# Patient Record
Sex: FEMALE | Race: WHITE | NOT HISPANIC OR LATINO | Employment: PART TIME | URBAN - NONMETROPOLITAN AREA
[De-identification: names, ages, dates, MRNs, and addresses within clinical notes are randomized per-mention and may not be internally consistent; named-entity substitution may affect disease eponyms.]

---

## 2019-09-05 NOTE — PROGRESS NOTES
Subjective     Lianne Martinez is a 29 year old female who presents to clinic today for the following health issues:    HPI   New Patient/Transfer of Care  Mole/skin check       Duration:  /has had a few removed     Description (location/character/radiation): on chest/face/ neck     Intensity:  mild    Accompanying signs and symptoms: irregular shaped / compound nevus .     History (similar episodes/previous evaluation): had a few removed.     Precipitating or alleviating factors: None    Therapies tried and outcome: None     Last PAP 2018 and normal per her report. She is a good historian.     No medications.     Left lower pelvic/ovary pain for 4 years. She had a  4 years ago and pain has continued. This was her first and only pregnancy. Pain is continuous. She feels it is left ovary pain. She's had follow up 3 separate times without an ultrasound. Normal menses. Denies urinary symptoms. Denies family history of gyn diagnoses. Denies chance of pregnancy, but will be trying for pregnancy soon.       Patient Active Problem List   Diagnosis     Family history of genetic disease     Vulvodynia     Past Surgical History:   Procedure Laterality Date     C/SECTION, LOW TRANSVERSE  10/03/2015       Social History     Tobacco Use     Smoking status: Never Smoker     Smokeless tobacco: Never Used   Substance Use Topics     Alcohol use: Never     Frequency: Never     Family History   Problem Relation Age of Onset     Cirrhosis Mother      Liver Disease Mother      Liver Disease Maternal Aunt      Cirrhosis Maternal Aunt          No current outpatient medications on file.     Allergies   Allergen Reactions     Penicillins        Reviewed and updated as needed this visit by Provider  Allergies  Meds  Problems  Med Hx  Surg Hx         Review of Systems   ROS COMP: Constitutional, HEENT, cardiovascular, pulmonary, gi and gu systems are negative, except as otherwise noted. Moles are growing and changing colors  "to right side of neck and right collar bone area. Left ovary/pelvic pain.       Objective    BP 98/72 (BP Location: Right arm, Patient Position: Sitting, Cuff Size: Adult Regular)   Pulse 69   Temp 98.3  F (36.8  C)   Ht 1.628 m (5' 4.1\")   Wt 61.8 kg (136 lb 3.2 oz)   SpO2 100%   BMI 23.31 kg/m    Body mass index is 23.31 kg/m .  Physical Exam   GENERAL: healthy, alert and no distress  RESP: lungs clear to auscultation - no rales, rhonchi or wheezes  CV: regular rate and rhythm, normal S1 S2, no S3 or S4, no murmur, click or rub, no peripheral edema and peripheral pulses strong  ABDOMEN: soft, nontender, no hepatosplenomegaly, no masses and bowel sounds normal  SKIN: no suspicious rashes. Light brown shiny nevi to right collar bone region. Nevi increasing in size/shape to right side of neck.   PSYCH: mentation appears normal, affect normal/bright      Assessment & Plan   Assessment      Plan  (I78.1) Nevus, non-neoplastic  (primary encounter diagnosis)  Comment: Changing color and size of nevi.  Plan: DERMATOLOGY REFERRAL            (R10.2) Pelvic pain in female  Comment: Left ovary pain for 4 years post child birth via .   Plan: US Pelvic Complete with Transvaginal            (Z76.89) Encounter to establish care  Comment: UTD on health maintenance. Last PAP 2018-Normal  Plan: Care established.       See Patient Instructions    Return if symptoms worsen or fail to improve.    Elizabeth Walker NP  [facility]        "

## 2019-09-06 ENCOUNTER — OFFICE VISIT (OUTPATIENT)
Dept: FAMILY MEDICINE | Facility: OTHER | Age: 29
End: 2019-09-06
Attending: NURSE PRACTITIONER
Payer: OTHER GOVERNMENT

## 2019-09-06 VITALS
TEMPERATURE: 98.3 F | HEIGHT: 64 IN | DIASTOLIC BLOOD PRESSURE: 72 MMHG | SYSTOLIC BLOOD PRESSURE: 98 MMHG | OXYGEN SATURATION: 100 % | HEART RATE: 69 BPM | WEIGHT: 136.2 LBS | BODY MASS INDEX: 23.25 KG/M2

## 2019-09-06 DIAGNOSIS — I78.1 NEVUS, NON-NEOPLASTIC: Primary | ICD-10-CM

## 2019-09-06 DIAGNOSIS — R10.2 PELVIC PAIN IN FEMALE: ICD-10-CM

## 2019-09-06 DIAGNOSIS — Z76.89 ENCOUNTER TO ESTABLISH CARE: ICD-10-CM

## 2019-09-06 PROBLEM — N94.819 VULVODYNIA: Status: ACTIVE | Noted: 2018-05-09

## 2019-09-06 PROBLEM — Z84.89 FAMILY HISTORY OF GENETIC DISEASE: Status: ACTIVE | Noted: 2018-05-09

## 2019-09-06 PROCEDURE — 99203 OFFICE O/P NEW LOW 30 MIN: CPT | Performed by: NURSE PRACTITIONER

## 2019-09-06 SDOH — HEALTH STABILITY: MENTAL HEALTH: HOW OFTEN DO YOU HAVE A DRINK CONTAINING ALCOHOL?: NEVER

## 2019-09-06 ASSESSMENT — ANXIETY QUESTIONNAIRES
GAD7 TOTAL SCORE: 0
2. NOT BEING ABLE TO STOP OR CONTROL WORRYING: NOT AT ALL
5. BEING SO RESTLESS THAT IT IS HARD TO SIT STILL: NOT AT ALL
IF YOU CHECKED OFF ANY PROBLEMS ON THIS QUESTIONNAIRE, HOW DIFFICULT HAVE THESE PROBLEMS MADE IT FOR YOU TO DO YOUR WORK, TAKE CARE OF THINGS AT HOME, OR GET ALONG WITH OTHER PEOPLE: NOT DIFFICULT AT ALL
6. BECOMING EASILY ANNOYED OR IRRITABLE: NOT AT ALL
1. FEELING NERVOUS, ANXIOUS, OR ON EDGE: NOT AT ALL
3. WORRYING TOO MUCH ABOUT DIFFERENT THINGS: NOT AT ALL
7. FEELING AFRAID AS IF SOMETHING AWFUL MIGHT HAPPEN: NOT AT ALL

## 2019-09-06 ASSESSMENT — PATIENT HEALTH QUESTIONNAIRE - PHQ9
SUM OF ALL RESPONSES TO PHQ QUESTIONS 1-9: 0
5. POOR APPETITE OR OVEREATING: NOT AT ALL

## 2019-09-06 ASSESSMENT — MIFFLIN-ST. JEOR: SCORE: 1329.39

## 2019-09-06 ASSESSMENT — PAIN SCALES - GENERAL: PAINLEVEL: NO PAIN (0)

## 2019-09-06 NOTE — NURSING NOTE
"Chief Complaint   Patient presents with     Establish Care       Initial BP 98/72 (BP Location: Right arm, Patient Position: Sitting, Cuff Size: Adult Regular)   Pulse 69   Temp 98.3  F (36.8  C)   Ht 1.628 m (5' 4.1\")   Wt 61.8 kg (136 lb 3.2 oz)   SpO2 100%   BMI 23.31 kg/m   Estimated body mass index is 23.31 kg/m  as calculated from the following:    Height as of this encounter: 1.628 m (5' 4.1\").    Weight as of this encounter: 61.8 kg (136 lb 3.2 oz).  Medication Reconciliation: complete  "

## 2019-09-06 NOTE — PATIENT INSTRUCTIONS
Patient Education     Monitoring Moles     Don't forget to check your feet.   Moles, also called nevi, are small, colored (pigmented) marks on the skin. They have no known purpose. Many moles appear before age 30, but they also increase frequently as people age. Moles most often are not cancer (benign) and are harmless. But some become cancerous (malignant). That s why you need to watch the moles on your body and tell your healthcare provider about any that concern you.  What are moles?  Moles are a type of pigmented nia. Freckles are another type of pigmented nai. They are often sprinkled across the bridge of the nose, the cheeks, and the arms. Moles can appear on any part of the body. There are many types, sizes, and shapes of moles. Most moles are solid brown. In most cases they are flat or dome-shaped, smooth, and have well-defined edges.  Why worry about moles?  Most moles are benign and don t require treatment. You can have moles removed if you don t like the way they look or feel. But moles may become a problem if they appear after you are 30, or if they change in certain ways. These moles may turn into melanoma, a type of skin cancer. Melanoma is one of the fastest growing cancers in the [place] It is often curable if caught early. But this disease can be life-threatening, particularly when not diagnosed early. Your risk for melanoma is higher if you:    Have a lot of moles    Have had more lifetime exposure to the sun    Have had severe blistering sunburns    Use tanning beds    Have a personal or family history of skin cancer  To manage your risk, it s smart to check your moles for changes and ask your healthcare provider to do a thorough skin exam when you have a physical exam. To do this, you first need to learn where your moles are. Then, be sure to check your moles each month.  Checking your moles  You can check many of your moles each month. You can do this right after you shower and before you get  dressed. Check your body from head to toe. Then, make a list of your moles. If you find any new moles or changes in your moles, call your healthcare provider. To check your moles, you ll need:    A full-length mirror    A stool or chair to sit on while you check your feet  If you have a lot of moles, take digital photos of them. Make sure to take photos both up close and from a distance. These can help you see if any moles change over time.  When to seek medical treatment  See your healthcare provider if your moles hurt, itch, ooze, bleed, thicken, become crusty, or show other changes. Also, be sure to call your health care provider if your moles show any of the following signs of melanoma:    A change in size, shape, color, or height    The sides don t match (asymmetry)    Ragged, notched, or blurred borders    Different colors within the same mole    Size is larger than 5 mm or 6 mm in diameter (the size of a pencil eraser)  Date Last Reviewed: 2/1/2017 2000-2018 [employer]. [address] [place] [address]. All rights reserved. This information is not intended as a substitute for professional medical care. Always follow your healthcare professional's instructions.    I placed an order for pelvic ultrasound.   I placed a dermatology consult for you.   Follow as needed.

## 2019-09-07 ASSESSMENT — ANXIETY QUESTIONNAIRES: GAD7 TOTAL SCORE: 0

## 2019-09-10 ENCOUNTER — HOSPITAL ENCOUNTER (OUTPATIENT)
Dept: ULTRASOUND IMAGING | Facility: HOSPITAL | Age: 29
Discharge: HOME | End: 2019-09-10
Attending: NURSE PRACTITIONER | Admitting: NURSE PRACTITIONER
Payer: OTHER GOVERNMENT

## 2019-09-10 DIAGNOSIS — R10.2 PELVIC PAIN IN FEMALE: ICD-10-CM

## 2019-09-10 PROCEDURE — 76830 TRANSVAGINAL US NON-OB: CPT | Mod: TC

## 2019-09-16 ENCOUNTER — TELEPHONE (OUTPATIENT)
Dept: FAMILY MEDICINE | Facility: OTHER | Age: 29
End: 2019-09-16

## 2019-09-16 NOTE — TELEPHONE ENCOUNTER
Tried to call patient to speak with her. Left message to have her call me back to discus US results.     Sweetie Diana LPN

## 2019-09-16 NOTE — TELEPHONE ENCOUNTER
Patient would like to speak to PCP.  States she had an US on her ovaries that came back normal but she is still having some discomfort on her left side and is wondering if the US showed any cysts.  Please call at [phone]

## 2019-09-16 NOTE — TELEPHONE ENCOUNTER
Normal ultrasound. She did have a cyst from a right follicle that ruptured. Normal with menses cycle.

## 2019-12-28 ENCOUNTER — OFFICE VISIT (OUTPATIENT)
Dept: FAMILY MEDICINE | Facility: OTHER | Age: 29
End: 2019-12-28
Attending: FAMILY MEDICINE
Payer: OTHER GOVERNMENT

## 2019-12-28 VITALS
TEMPERATURE: 98.4 F | HEART RATE: 85 BPM | OXYGEN SATURATION: 98 % | RESPIRATION RATE: 16 BRPM | BODY MASS INDEX: 26.19 KG/M2 | WEIGHT: 147.8 LBS | DIASTOLIC BLOOD PRESSURE: 70 MMHG | SYSTOLIC BLOOD PRESSURE: 118 MMHG | HEIGHT: 63 IN

## 2019-12-28 DIAGNOSIS — H92.01 OTALGIA OF RIGHT EAR: Primary | ICD-10-CM

## 2019-12-28 PROCEDURE — 99202 OFFICE O/P NEW SF 15 MIN: CPT | Performed by: NURSE PRACTITIONER

## 2019-12-28 ASSESSMENT — MIFFLIN-ST. JEOR: SCORE: 1364.55

## 2019-12-28 ASSESSMENT — PAIN SCALES - GENERAL: PAINLEVEL: MODERATE PAIN (5)

## 2019-12-28 NOTE — PATIENT INSTRUCTIONS
Patient Education     Earache, No Infection (Adult)  Earaches can happen without an infection. This occurs when air and fluid build up behind the eardrum causing a feeling of fullness and discomfort and reduced hearing. This is called otitis media with effusion (OME) or serous otitis media. It means there is fluid in the middle ear. It is not the same as acute otitis media, which is typically from infection.  OME can happen when you have a cold if congestion blocks the passage that drains the middle ear. This passage is called the eustachian tube. OME may also occur with nasal allergies or after a bacterial middle ear infection.    The pain or discomfort may come and go. You may hear clicking or popping sounds when you chew or swallow. You may feel that your balance is off. Or you may hear ringing in the ear.  It often takes from several weeks up to 3 months for the fluid to clear on its own. Oral pain relievers and ear drops help if there is pain. Decongestants and antihistamines sometimes help. Antibiotics don't help since there is no infection. Your doctor may prescribe a nasal spray to help reduce swelling in the nose and eustachian tube. This can allow the ear to drain.  If your OME doesn't improve after 3 months, surgery may be used to drain the fluid and insert a small tube in the eardrum to allow continued drainage.  Because the middle ear fluid can become infected, it is important to watch for signs of an ear infection which may develop later. These signs include increased ear pain, fever, or drainage from the ear.  Home care  The following guidelines will help you care for yourself at home:    You may use over-the-counter medicine as directed to control pain, unless another medicine was prescribed. If you have chronic liver or kidney disease or ever had a stomach ulcer or GI bleeding, talk with your doctor before using these medicines. Aspirin should never be used in anyone under 18 years of age who is  ill with a fever. It may cause severe liver damage.    You may use over-the-counter decongestants such as phenylephrine or pseudoephedrine. But they are not always helpful. Don't use nasal spray decongestants more than 3 days. Longer use can make congestion worse. Prescription nasal sprays from your doctor don't typically have those restrictions.    Antihistamines may help if you are also having allergy symptoms.    You may use medicines such as guaifenesin to thin mucus and promote drainage.  Follow-up care  Follow up with your healthcare provider or as advised if you are not feeling better after 3 days.  When to seek medical advice  Call your healthcare provider right away if any of the following occur:    Your ear pain gets worse or does not start to improve     Fever of 100.4 F (38 C) or higher, or as directed by your healthcare provider    Fluid or blood draining from the ear    Headache or sinus pain    Stiff neck    Unusual drowsiness or confusion  Date Last Reviewed: 10/1/2016    8940-7102 [employer]. [address] [place] [address], [place] [address]. All rights reserved. This information is not intended as a substitute for professional medical care. Always follow your healthcare professional's instructions.

## 2019-12-28 NOTE — NURSING NOTE
Chief Complaint   Patient presents with     Ear Problem       Medication Reconciliation: complete  Cira Hu LPN  ..................12/28/2019   5:08 PM   EAR PAIN  Onset: yesterday  Location:  right  Fever:  no  Recent URI:  Runny nose  Discharge:  Draining clear  Able to sleep:  no  Pain Scale:  5  Tylenol last taken 1700  Cira Hu LPN .............12/28/2019  5:09 PM

## 2019-12-28 NOTE — PROGRESS NOTES
"HPI:    Lianne Martinez is a 29 year old female who presents to clinic today for right ear pain. Present since yesterday, worse this evening. No fevers, cough or sore throat. Mild runny nose. Took tylenol this evening with relief of pain. Using heating pad as well. No hx of ear issues as an adult.       Past Medical History:   Diagnosis Date     Family history of genetic disease     mother  in her 30s of primary biliary cirrhosis. LFTs 18 WNL      IBS (irritable bowel syndrome)      Vulvodynia 2018         Current Outpatient Medications   Medication Sig Dispense Refill     Multiple Vitamins-Minerals (MULTIVITAMIN ADULT PO) Take 1 tablet by mouth daily         Allergies   Allergen Reactions     Penicillins        ROS:  Pertinent positives and negatives are noted in HPI.    EXAM:  /70 (BP Location: Right arm, Patient Position: Sitting, Cuff Size: Adult Regular)   Pulse 85   Temp 98.4  F (36.9  C) (Tympanic)   Resp 16   Ht 1.6 m (5' 3\")   Wt 67 kg (147 lb 12.8 oz)   LMP 2019   SpO2 98%   Breastfeeding No   BMI 26.18 kg/m    General appearance: well appearing female, in no acute distress  Head: normocephalic, atraumatic  Ears: TM's with cone of light, mild erythema to right TM, canals clear bilaterally  Eyes: conjunctivae normal  Oropharynx: moist mucous membranes, tonsils without erythema, exudates or petechiae, no post nasal drip seen  Neck: supple without adenopathy  Respiratory: clear to auscultation bilaterally  Cardiac: RRR with no murmurs  Psychological: normal affect, alert and pleasant    ASSESSMENT AND PLAN:    1. Otalgia of right ear      Otalgia of right ear with mild erythema, no signs of infection. Discussed sx management with heat, NSAID's as well as reviewing s/s that would warrant f/u.       Lynnette Martinez, WILFREDO CNP..................2019 5:10 PM      This document was prepared using voice generated software.  While every attempt was made for accuracy, grammatical " errors may exist.

## 2020-01-21 ENCOUNTER — OFFICE VISIT (OUTPATIENT)
Dept: DERMATOLOGY | Facility: OTHER | Age: 30
End: 2020-01-21
Attending: DERMATOLOGY
Payer: OTHER GOVERNMENT

## 2020-01-21 VITALS
OXYGEN SATURATION: 98 % | TEMPERATURE: 96.7 F | HEART RATE: 92 BPM | RESPIRATION RATE: 18 BRPM | DIASTOLIC BLOOD PRESSURE: 76 MMHG | SYSTOLIC BLOOD PRESSURE: 128 MMHG

## 2020-01-21 DIAGNOSIS — I78.1 NEVUS, NON-NEOPLASTIC: ICD-10-CM

## 2020-01-21 PROCEDURE — 99202 OFFICE O/P NEW SF 15 MIN: CPT | Performed by: DERMATOLOGY

## 2020-01-21 ASSESSMENT — PAIN SCALES - GENERAL: PAINLEVEL: NO PAIN (0)

## 2020-01-21 NOTE — LETTER
2020       RE: Lianne Martinez  [address] [facility] [address]     Dear Colleague,    Thank you for referring your patient, Lianne Martinez, to the [facility]. Please see a copy of my visit note below.    dictated    Service Date: 2020      SUBJECTIVE:  Lianne is concerned about some lesions on her chest and neck that may be of concern.  She has had some moles removed in the past and has 2 excision scars on her skin.      OBJECTIVE:  Shows a healthy lady in no distress.  We checked her upper chest, her back and her face.  Present were numerous very tiny nevi.  I saw no lesions that were concerning within any of these areas.      ASSESSMENT:  No worrisome lesions.  Discussed what to look for with an advancing lesion such as an increase in size, development of black color or multiple colors in a lesion.      PLAN:  Advised that the facial lesions of skin cancer are usually papular and not that serious compared to the melanoma concerns.  Overall, I think she will do fine.  I would be happy to see her in the future should there be any concern about particular lesions or group of lesions.      Medications and allergies reviewed.         JONNY WEBB MD             D: 2020   T: 2020   MT:       Name:     LIANNE MARTINEZ   MRN:      [id number]        Account:      [id number]   :      1990           Service Date: 2020      Document: [id number]       cc: Elizabeth Walker NP       Again, thank you for allowing me to participate in the care of your patient.      Sincerely,    JONNY Webb MD

## 2020-01-21 NOTE — NURSING NOTE
"Chief Complaint   Patient presents with     Consult     Skin lesion on chest and neck       Initial /76 (BP Location: Left arm, Patient Position: Sitting, Cuff Size: Adult Regular)   Pulse 92   Temp 96.7  F (35.9  C) (Tympanic)   Resp 18   SpO2 98%  Estimated body mass index is 26.18 kg/m  as calculated from the following:    Height as of 12/28/19: 1.6 m (5' 3\").    Weight as of 12/28/19: 67 kg (147 lb 12.8 oz).  Medication Reconciliation: joe Chu  "

## 2020-01-22 NOTE — PROGRESS NOTES
Service Date: 2020      SUBJECTIVE:  Lianne is concerned about some lesions on her chest and neck that may be of concern.  She has had some moles removed in the past and has 2 excision scars on her skin.      OBJECTIVE:  Shows a healthy lady in no distress.  We checked her upper chest, her back and her face.  Present were numerous very tiny nevi.  I saw no lesions that were concerning within any of these areas.      ASSESSMENT:  No worrisome lesions.  Discussed what to look for with an advancing lesion such as an increase in size, development of black color or multiple colors in a lesion.      PLAN:  Advised that the facial lesions of skin cancer are usually papular and not that serious compared to the melanoma concerns.  Overall, I think she will do fine.  I would be happy to see her in the future should there be any concern about particular lesions or group of lesions.      Medications and allergies reviewed.         JONNY MENDOSA MD             D: 2020   T: 2020   MT:       Name:     LIANNE HUITRON   MRN:      [id number]        Account:      [id number]   :      1990           Service Date: 2020      Document: [id number]       cc: Elizabeth Walker NP

## 2020-03-11 ENCOUNTER — HEALTH MAINTENANCE LETTER (OUTPATIENT)
Age: 30
End: 2020-03-11

## 2020-09-25 ENCOUNTER — NURSE TRIAGE (OUTPATIENT)
Dept: FAMILY MEDICINE | Facility: OTHER | Age: 30
End: 2020-09-25

## 2020-09-25 NOTE — TELEPHONE ENCOUNTER
"Patient called clinic with upper respiratory symptoms. Patient also reports constant slight chest pain. Patient advised due to chest pain patient should be evaluated in the UC/ED. Patient declined and asked to be scheduled for curbside testing. Nurse scheduled patient for curbside testing. Nurse informed patient of isolation guidelines per MD/CDC.  Nurse advised patient to be seen UC/ED if symptoms worsened. Patient verbalized understanding.    Reason for Disposition    SEVERE or constant chest pain or pressure (Exception: mild central chest pain, present only when coughing)    Additional Information    Negative: SEVERE difficulty breathing (e.g., struggling for each breath, speaks in single words)    Negative: Difficult to awaken or acting confused (e.g., disoriented, slurred speech)    Negative: Bluish (or gray) lips or face now    Negative: Shock suspected (e.g., cold/pale/clammy skin, too weak to stand, low BP, rapid pulse)    Negative: Sounds like a life-threatening emergency to the triager    Negative: [1] COVID-19 exposure AND [2] no symptoms    Negative: [1] Adult with possible COVID-19 symptoms AND [2] triager concerned about severity of symptoms or other causes    Negative: COVID-19 and Breastfeeding, questions about    Answer Assessment - Initial Assessment Questions  1. COVID-19 DIAGNOSIS: \"Who made your Coronavirus (COVID-19) diagnosis?\" \"Was it confirmed by a positive lab test?\" If not diagnosed by a HCP, ask \"Are there lots of cases (community spread) where you live?\" (See public health department website, if unsure)      No diagnosis  2. ONSET: \"When did the COVID-19 symptoms start?\"       yesterday  3. WORST SYMPTOM: \"What is your worst symptom?\" (e.g., cough, fever, shortness of breath, muscle aches)      cough  4. COUGH: \"Do you have a cough?\" If so, ask: \"How bad is the cough?\"        Yes, patient states coughing is not too bad  5. FEVER: \"Do you have a fever?\" If so, ask: \"What is your " "temperature, how was it measured, and when did it start?\"      no  6. RESPIRATORY STATUS: \"Describe your breathing?\" (e.g., shortness of breath, wheezing, unable to speak)       no  7. BETTER-SAME-WORSE: \"Are you getting better, staying the same or getting worse compared to yesterday?\"  If getting worse, ask, \"In what way?\"      worse  8. HIGH RISK DISEASE: \"Do you have any chronic medical problems?\" (e.g., asthma, heart or lung disease, weak immune system, etc.)      no  9. PREGNANCY: \"Is there any chance you are pregnant?\" \"When was your last menstrual period?\"      no  10. OTHER SYMPTOMS: \"Do you have any other symptoms?\"  (e.g., chills, fatigue, headache, loss of smell or taste, muscle pain, sore throat)        headache    Protocols used: CORONAVIRUS (COVID-19) DIAGNOSED OR [id number] 8.4.20      "

## 2020-09-26 ENCOUNTER — OFFICE VISIT (OUTPATIENT)
Dept: FAMILY MEDICINE | Facility: OTHER | Age: 30
End: 2020-09-26
Attending: NURSE PRACTITIONER
Payer: OTHER GOVERNMENT

## 2020-09-26 DIAGNOSIS — R05.9 COUGH: Primary | ICD-10-CM

## 2020-09-26 DIAGNOSIS — J34.89 RHINORRHEA: ICD-10-CM

## 2020-09-26 PROCEDURE — U0003 INFECTIOUS AGENT DETECTION BY NUCLEIC ACID (DNA OR RNA); SEVERE ACUTE RESPIRATORY SYNDROME CORONAVIRUS 2 (SARS-COV-2) (CORONAVIRUS DISEASE [COVID-19]), AMPLIFIED PROBE TECHNIQUE, MAKING USE OF HIGH THROUGHPUT TECHNOLOGIES AS DESCRIBED BY CMS-2020-01-R: HCPCS | Performed by: NURSE PRACTITIONER

## 2020-09-27 LAB
SARS-COV-2 RNA SPEC QL NAA+PROBE: NOT DETECTED
SPECIMEN SOURCE: NORMAL

## 2021-01-03 ENCOUNTER — HEALTH MAINTENANCE LETTER (OUTPATIENT)
Age: 31
End: 2021-01-03

## 2021-02-11 NOTE — PROGRESS NOTES
SUBJECTIVE:   CC: Lianne Martinez is an 30 year old woman who presents for preventive health visit.     Healthy Habits:    Do you get at least three servings of calcium containing foods daily (dairy, green leafy vegetables, etc.)? yes    Amount of exercise or daily activities, outside of work: .5 hour(s) per day    Problems taking medications regularly not applicable    Medication side effects: No    Have you had an eye exam in the past two years? yes    Do you see a dentist twice per year? yes    Do you have sleep apnea, excessive snoring or daytime drowsiness?no    She is due for pap. She started her period today and would like to defer pap for now. She will schedule pap when she doesn't have menses. She is not fasting this am. Lipid pending for future when she is fasting.     Today's PHQ-2 Score:   PHQ-2 ( 1999 [employer]) 2/16/2021 12/28/2019   Q1: Little interest or pleasure in doing things 0 0   Q2: Feeling down, depressed or hopeless 0 0   PHQ-2 Score 0 0       Abuse: Current or Past(Physical, Sexual or Emotional)- No  Do you feel safe in your environment? Yes        Social History     Tobacco Use     Smoking status: Never Smoker     Smokeless tobacco: Never Used   Substance Use Topics     Alcohol use: Never     Frequency: Never     If you drink alcohol do you typically have >3 drinks per day or >7 drinks per week? No                     Reviewed orders with patient.  Reviewed health maintenance and updated orders accordingly - Yes  Patient Active Problem List   Diagnosis     Family history of genetic disease     Vulvodynia     Past Surgical History:   Procedure Laterality Date     C/SECTION, LOW TRANSVERSE  10/03/2015       Social History     Tobacco Use     Smoking status: Never Smoker     Smokeless tobacco: Never Used   Substance Use Topics     Alcohol use: Never     Frequency: Never     Family History   Problem Relation Age of Onset     Cirrhosis Mother      Liver Disease Mother      Liver Disease Maternal  Aunt      Cirrhosis Maternal Aunt          Current Outpatient Medications   Medication Sig Dispense Refill     Multiple Vitamins-Minerals (MULTIVITAMIN ADULT PO) Take 1 tablet by mouth daily       Allergies   Allergen Reactions     Penicillins        Breast CA Risk Screening:  No breast cancer in family known. She doesn't know her biological fathers side of the family's history.     Patient under 40 years of age: Routine Mammogram Screening not recommended.   Pertinent mammograms are reviewed under the imaging tab.    Pertinent mammograms are reviewed under the imaging tab.  History of abnormal Pap smear: NO - age 30- 65 PAP every 3 years recommended     Reviewed and updated as needed this visit by clinical staff  Tobacco  Allergies  Meds   Med Hx  Surg Hx  Fam Hx  Soc Hx        Reviewed and updated as needed this visit by Provider                Past Medical History:   Diagnosis Date     Family history of genetic disease     mother  in her 30s of primary biliary cirrhosis. LFTs 18 WNL      IBS (irritable bowel syndrome)      Vulvodynia 2018      Past Surgical History:   Procedure Laterality Date     C/SECTION, LOW TRANSVERSE  10/03/2015     OB History   No obstetric history on file.       ROS:  CONSTITUTIONAL: NEGATIVE for fever, chills, change in weight  INTEGUMENTARU/SKIN: NEGATIVE for worrisome rashes, moles or lesions  EYES: NEGATIVE for vision changes or irritation  ENT: NEGATIVE for ear, mouth and throat problems  RESP: NEGATIVE for significant cough or SOB  BREAST: NEGATIVE for masses, tenderness or discharge  CV: NEGATIVE for chest pain, palpitations or peripheral edema  GI: NEGATIVE for nausea, abdominal pain, heartburn, or change in bowel habits  : NEGATIVE for unusual urinary or vaginal symptoms. Periods are regular.  MUSCULOSKELETAL: NEGATIVE for significant arthralgias or myalgia  NEURO: NEGATIVE for weakness, dizziness or paresthesias  ENDOCRINE: NEGATIVE for temperature  "intolerance, skin/hair changes  PSYCHIATRIC: NEGATIVE for changes in mood or affect    OBJECTIVE:   /68   Pulse 83   Temp 97.4  F (36.3  C)   Ht 1.631 m (5' 4.2\")   Wt 67.9 kg (149 lb 9.6 oz)   SpO2 99%   BMI 25.52 kg/m    EXAM:  GENERAL: healthy, alert and no distress  EYES: Eyes grossly normal to inspection, PERRL and conjunctivae and sclerae normal  HENT: ear canals and TM's normal, nose and mouth without ulcers or lesions  NECK: no adenopathy, no asymmetry, masses, or scars and thyroid normal to palpation  RESP: lungs clear to auscultation - no rales, rhonchi or wheezes  BREAST: normal without masses, tenderness or nipple discharge and no palpable axillary masses or adenopathy  CV: regular rate and rhythm, normal S1 S2, no S3 or S4, no murmur, click or rub, no peripheral edema and peripheral pulses strong  ABDOMEN: soft, nontender, no hepatosplenomegaly, no masses and bowel sounds normal   (female): deferred. On menses   MS: no gross musculoskeletal defects noted, no edema  SKIN: no suspicious lesions or rashes  NEURO: Normal strength and tone, mentation intact and speech normal  PSYCH: mentation appears normal, affect normal/bright    Diagnostic Test Results:  Labs reviewed in Epic  Results for orders placed or performed in visit on 02/16/21 (from the past 24 hour(s))   TSH with free T4 reflex   Result Value Ref Range    TSH 1.58 0.40 - 4.00 mU/L   Comprehensive metabolic panel (BMP + Alb, Alk Phos, ALT, AST, Total. Bili, TP)   Result Value Ref Range    Sodium 138 133 - 144 mmol/L    Potassium 4.3 3.4 - 5.3 mmol/L    Chloride 109 94 - 109 mmol/L    Carbon Dioxide 26 20 - 32 mmol/L    Anion Gap 3 3 - 14 mmol/L    Glucose 88 70 - 99 mg/dL    Urea Nitrogen 9 7 - 30 mg/dL    Creatinine 0.61 0.52 - 1.04 mg/dL    GFR Estimate >90 >60 mL/min/[1.73_m2]    GFR Estimate If Black >90 >60 mL/min/[1.73_m2]    Calcium 9.2 8.5 - 10.1 mg/dL    Bilirubin Total 0.4 0.2 - 1.3 mg/dL    Albumin 4.1 3.4 - 5.0 g/dL    " "Protein Total 7.4 6.8 - 8.8 g/dL    Alkaline Phosphatase 56 40 - 150 U/L    ALT 20 0 - 50 U/L    AST 17 0 - 45 U/L       ASSESSMENT/PLAN:       ICD-10-CM    1. Routine general medical examination at a health care facility  Z00.00 TDAP VACCINE (Adacel, Boostrix)  [[id number]]     HIV Antigen Antibody Combo     Hepatitis C Screen Reflex to HCV RNA Quant and Genotype     Lipid Profile     TSH with free T4 reflex     Comprehensive metabolic panel (BMP + Alb, Alk Phos, ALT, AST, Total. Bili, TP)     CANCELED: A pap thin layer screen with  HPV - recommended age 30 - 65 years (select HPV order below)     CANCELED: HPV High Risk Types DNA Cervical     CANCELED: Lipid Profile     CANCELED: Basic metabolic panel       Patient has been advised of split billing requirements and indicates understanding:   COUNSELING:   Reviewed preventive health counseling, as reflected in patient instructions       Regular exercise       Healthy diet/nutrition       Vision screening       Family planning       Consider Hep C screening for all patients one time for ages 18-79 years       HIV screeninx in teen years, 1x in adult years, and at intervals if high risk    Estimated body mass index is 25.52 kg/m  as calculated from the following:    Height as of this encounter: 1.631 m (5' 4.2\").    Weight as of this encounter: 67.9 kg (149 lb 9.6 oz).        She reports that she has never smoked. She has never used smokeless tobacco.      Counseling Resources:  ATP IV Guidelines  Pooled Cohorts Equation Calculator  Breast Cancer Risk Calculator  BRCA-Related Cancer Risk Assessment: FHS-7 Tool  FRAX Risk Assessment  ICSI Preventive Guidelines  Dietary Guidelines for Americans, 2010  USDA's MyPlate  ASA Prophylaxis  Lung CA Screening    Elizabeth Walker NP  [facility] - [place]  "

## 2021-02-16 ENCOUNTER — OFFICE VISIT (OUTPATIENT)
Dept: FAMILY MEDICINE | Facility: OTHER | Age: 31
End: 2021-02-16
Attending: NURSE PRACTITIONER
Payer: OTHER GOVERNMENT

## 2021-02-16 VITALS
DIASTOLIC BLOOD PRESSURE: 68 MMHG | WEIGHT: 149.6 LBS | HEIGHT: 64 IN | SYSTOLIC BLOOD PRESSURE: 126 MMHG | OXYGEN SATURATION: 99 % | TEMPERATURE: 97.4 F | BODY MASS INDEX: 25.54 KG/M2 | HEART RATE: 83 BPM

## 2021-02-16 DIAGNOSIS — Z00.00 ROUTINE GENERAL MEDICAL EXAMINATION AT A HEALTH CARE FACILITY: Primary | ICD-10-CM

## 2021-02-16 LAB
ALBUMIN SERPL-MCNC: 4.1 G/DL (ref 3.4–5)
ALP SERPL-CCNC: 56 U/L (ref 40–150)
ALT SERPL W P-5'-P-CCNC: 20 U/L (ref 0–50)
ANION GAP SERPL CALCULATED.3IONS-SCNC: 3 MMOL/L (ref 3–14)
AST SERPL W P-5'-P-CCNC: 17 U/L (ref 0–45)
BILIRUB SERPL-MCNC: 0.4 MG/DL (ref 0.2–1.3)
BUN SERPL-MCNC: 9 MG/DL (ref 7–30)
CALCIUM SERPL-MCNC: 9.2 MG/DL (ref 8.5–10.1)
CHLORIDE SERPL-SCNC: 109 MMOL/L (ref 94–109)
CO2 SERPL-SCNC: 26 MMOL/L (ref 20–32)
CREAT SERPL-MCNC: 0.61 MG/DL (ref 0.52–1.04)
GFR SERPL CREATININE-BSD FRML MDRD: >90 ML/MIN/{1.73_M2}
GLUCOSE SERPL-MCNC: 88 MG/DL (ref 70–99)
POTASSIUM SERPL-SCNC: 4.3 MMOL/L (ref 3.4–5.3)
PROT SERPL-MCNC: 7.4 G/DL (ref 6.8–8.8)
SODIUM SERPL-SCNC: 138 MMOL/L (ref 133–144)
TSH SERPL DL<=0.005 MIU/L-ACNC: 1.58 MU/L (ref 0.4–4)

## 2021-02-16 PROCEDURE — 36415 COLL VENOUS BLD VENIPUNCTURE: CPT | Performed by: NURSE PRACTITIONER

## 2021-02-16 PROCEDURE — 86803 HEPATITIS C AB TEST: CPT | Performed by: NURSE PRACTITIONER

## 2021-02-16 PROCEDURE — 84443 ASSAY THYROID STIM HORMONE: CPT | Performed by: NURSE PRACTITIONER

## 2021-02-16 PROCEDURE — 80053 COMPREHEN METABOLIC PANEL: CPT | Performed by: NURSE PRACTITIONER

## 2021-02-16 PROCEDURE — 90715 TDAP VACCINE 7 YRS/> IM: CPT | Performed by: NURSE PRACTITIONER

## 2021-02-16 PROCEDURE — 99395 PREV VISIT EST AGE 18-39: CPT | Mod: 25 | Performed by: NURSE PRACTITIONER

## 2021-02-16 PROCEDURE — 90471 IMMUNIZATION ADMIN: CPT | Performed by: NURSE PRACTITIONER

## 2021-02-16 PROCEDURE — 87389 HIV-1 AG W/HIV-1&-2 AB AG IA: CPT | Performed by: NURSE PRACTITIONER

## 2021-02-16 ASSESSMENT — PAIN SCALES - GENERAL: PAINLEVEL: NO PAIN (0)

## 2021-02-16 ASSESSMENT — MIFFLIN-ST. JEOR: SCORE: 1386.76

## 2021-02-16 NOTE — NURSING NOTE
"Chief Complaint   Patient presents with     Physical       Initial /68   Pulse 83   Temp 97.4  F (36.3  C)   Ht 1.631 m (5' 4.2\")   Wt 67.9 kg (149 lb 9.6 oz)   SpO2 99%   BMI 25.52 kg/m   Estimated body mass index is 25.52 kg/m  as calculated from the following:    Height as of this encounter: 1.631 m (5' 4.2\").    Weight as of this encounter: 67.9 kg (149 lb 9.6 oz).  Medication Reconciliation: complete  Sweetie Diana  "

## 2021-02-17 LAB
HCV AB SERPL QL IA: NONREACTIVE
HIV 1+2 AB+HIV1 P24 AG SERPL QL IA: NONREACTIVE

## 2021-02-19 ENCOUNTER — OFFICE VISIT (OUTPATIENT)
Dept: SURGERY | Facility: OTHER | Age: 31
End: 2021-02-19
Attending: SURGERY
Payer: OTHER GOVERNMENT

## 2021-02-19 VITALS
WEIGHT: 149 LBS | HEIGHT: 64 IN | OXYGEN SATURATION: 100 % | BODY MASS INDEX: 25.44 KG/M2 | DIASTOLIC BLOOD PRESSURE: 70 MMHG | SYSTOLIC BLOOD PRESSURE: 122 MMHG | TEMPERATURE: 97.4 F | HEART RATE: 76 BPM

## 2021-02-19 DIAGNOSIS — L98.9 SKIN LESION: Primary | ICD-10-CM

## 2021-02-19 PROCEDURE — 99203 OFFICE O/P NEW LOW 30 MIN: CPT | Performed by: SURGERY

## 2021-02-19 ASSESSMENT — MIFFLIN-ST. JEOR: SCORE: 1380.86

## 2021-02-19 ASSESSMENT — PAIN SCALES - GENERAL: PAINLEVEL: NO PAIN (0)

## 2021-02-19 NOTE — NURSING NOTE
"Chief Complaint   Patient presents with     Consult     Skin lesion       Initial /70 (BP Location: Left arm, Patient Position: Chair, Cuff Size: Adult Regular)   Pulse 76   Temp 97.4  F (36.3  C) (Tympanic)   Ht 1.626 m (5' 4\")   Wt 67.6 kg (149 lb)   SpO2 100%   BMI 25.58 kg/m   Estimated body mass index is 25.58 kg/m  as calculated from the following:    Height as of this encounter: 1.626 m (5' 4\").    Weight as of this encounter: 67.6 kg (149 lb).  Medication Reconciliation: complete  Faye Richmond LPN    "

## 2021-02-19 NOTE — PATIENT INSTRUCTIONS
Thank you for allowing Dr. Torres and our surgical team to participate in your care.  If you have a scheduling or an appointment question please contact our Health Unit Coordinator at her direct line [phone].   ALL nursing questions or concerns can be directed to your surgical nurse at: [phone]

## 2021-02-24 NOTE — PROGRESS NOTES
"[facility] Surgery Consultation    CC:  Skin leisons    HPI:  This 30 year old year old female is seen at the request of Elizabeth Walker for evaluation of skin lesions. She has noted the lesion on her left upper arm for several months. She notes that it has gotten slightly bigger. She has also noted a lesion on her left thigh that has popped up in the last two months. They do not bleed they are not painful. NO pigmentation. She does have history of nevi with atypia removed in the past. Has dermatology scheduled. She is fair skinned.     Past Medical History:   Diagnosis Date     Family history of genetic disease     mother  in her 30s of primary biliary cirrhosis. LFTs 18 WNL      IBS (irritable bowel syndrome)      Vulvodynia 2018       Past Surgical History:   Procedure Laterality Date     C/SECTION, LOW TRANSVERSE  10/03/2015       Allergies   Allergen Reactions     Penicillins        Current Outpatient Medications   Medication     Multiple Vitamins-Minerals (MULTIVITAMIN ADULT PO)     No current facility-administered medications for this visit.        HABITS:    Social History     Tobacco Use     Smoking status: Never Smoker     Smokeless tobacco: Never Used   Substance Use Topics     Alcohol use: Never     Frequency: Never     Drug use: Never       Family History   Problem Relation Age of Onset     Cirrhosis Mother      Liver Disease Mother      Liver Disease Maternal Aunt      Cirrhosis Maternal Aunt        REVIEW OF SYSTEMS:  Ten point review of systems negative except those mentioned in the HPI.     OBJECTIVE:    /70 (BP Location: Left arm, Patient Position: Chair, Cuff Size: Adult Regular)   Pulse 76   Temp 97.4  F (36.3  C) (Tympanic)   Ht 1.626 m (5' 4\")   Wt 67.6 kg (149 lb)   SpO2 100%   BMI 25.58 kg/m      GENERAL: Generally appears well, in no distress with appropriate affect.  HEENT:   Sclerae anicteric - normocephalic atraumatic  :  deferred  Extremities:  " Extremities normal. No deformities, edema, or skin discoloration.  Skin: There is a sub centimeter, lesion on upper left arm and left thigh, no pigmentation slightly raised with scale.     Neurological: grossly intact  Psych:  Alert, oriented, affect appropriate with normal decision making ability.    IMPRESSION:    30 y.o. female with history of atypical nevi, she has two skin lesions that appear benign to me. They are small enough that could easily biopsy today as they seem to be causing her distress. She did schedule and has an appointment with a dermatologist next month. She would like to wait until that visit to do anything, which seems prudent to me. Follow up PRN     PLAN:    See above.     Neto Torres MD,     2/24/2021  4:51 PM

## 2021-03-19 ENCOUNTER — VIRTUAL VISIT (OUTPATIENT)
Dept: OBGYN | Facility: OTHER | Age: 31
End: 2021-03-19
Attending: STUDENT IN AN ORGANIZED HEALTH CARE EDUCATION/TRAINING PROGRAM
Payer: OTHER GOVERNMENT

## 2021-03-19 VITALS — HEIGHT: 63 IN | BODY MASS INDEX: 25.69 KG/M2 | WEIGHT: 145 LBS

## 2021-03-19 DIAGNOSIS — O36.80X0 ENCOUNTER TO DETERMINE FETAL VIABILITY OF PREGNANCY, SINGLE OR UNSPECIFIED FETUS: Primary | ICD-10-CM

## 2021-03-19 PROCEDURE — 99207 PR OB VISIT-NO CHARGE - GICH ONLY: CPT | Mod: TEL

## 2021-03-19 ASSESSMENT — MIFFLIN-ST. JEOR: SCORE: 1346.85

## 2021-03-19 ASSESSMENT — PATIENT HEALTH QUESTIONNAIRE - PHQ9: SUM OF ALL RESPONSES TO PHQ QUESTIONS 1-9: 0

## 2021-03-19 NOTE — PROGRESS NOTES
Verbal consent obtained for telephone visit. Total length of call: 18 minutes    HPI:    This is a 30 year old female patient,  who was called today for OB Intake visit. Patient reports positive pregnancy test at home.     Obstetrical history and OB Demographics updated to the best of this nurse's ability based on patient report. PHQ-9 depression screening and routine Domestic Abuse screening completed. All immediate questions and concerns answered.    Last menstrual period is reported as Patient's last menstrual period was 2021. OSCAR based on LMP is 2021.  Her cycles are regular.  Her last menstrual period was normal.   Since her LMP, she has experienced  nausea and breast tenderness.       Personal OB history includes: age of first pregnancy 25, surgical births 1, G  2 and P  1  Previous OB Provider: Dr. Ledbetter  Previous Delivering Clinic: [facility]  Release of Records: Do at OB physical    Current delivery plan: Plans RCS at [facility]  Preferred OB Provider: Dr. Freida Williamson   Current Primary Care Provider: Dr. Walker  Pediatrician: Dr. Santo Perdomo    Additional History: None     Have you travelled during the pregnancy?No  Have your sexual partner(s) travelled during the pregnancy?No      HISTORY:   Planned Pregnancy: Yes  Marital Status:  - Chaim  Occupation: Teacher - [employer]  Living in Household: Significant Other and Children    Father of the baby is involved.   Family and father of baby is supportive of current pregnancy.  Past Medical History of Father of Baby:No significant medical history    Past History:  Her past medical history   Past Medical History:   Diagnosis Date     Family history of genetic disease     mother  in her 30s of primary biliary cirrhosis. LFTs 18 WNL      IBS (irritable bowel syndrome)      Vulvodynia 2018   .      She has a history of  prior  section - breech    Since her last LMP she denies use of  alcohol, tobacco and street drugs.    Pap smear history: Patient reports NIL about 2 years ago    STD/STI history: No STD history    STD/STI symptoms: no noticeable symptoms     Past medical, surgical, social and family history were reviewed and updated in EPIC.    Medications reviewed by this nurse. Current medication list:  Current Outpatient Medications   Medication Sig Dispense Refill     Multiple Vitamins-Minerals (MULTIVITAMIN ADULT PO) Take 1 tablet by mouth daily       The following medications were recommended to be discontinued due to Pregnancy Category D status: none      Risk factors:  Moderate and moderately severe risks (consult with OB/Gyn)  Previous fetal or  demise: No  History of  delivery: No  History of heart disease Class I: No  Severe anemia, unresponsive to iron therapy: No  Pelvic mass or neoplasm: No  Previous : Yes  Hyper/hypothyroidism: No  History of postpartum hemorrhage requiring transfusion:No  History of Placenta Accreta: No    High Risk (Pregnancy managed by OB/Gyn)  Multiple pregnancy: No  Pre-gestational diabetes: No  Chronic Hypertension: No  Renal Failure: No  Heart disease, class II or greater: No  Rh Isoimmunization: No  Chronic active hepatitis: No  Convulsive disorder, poorly controlled: No  Isoimmune thrombocytopenia: No  Pre-term premature rupture of membranes: No  Lupus or other autoimmune disorder: No  Human Immunodeficiency Virus: No      ASSESSMENT/PLAN:       ICD-10-CM    1. Encounter to determine fetal viability of pregnancy, single or unspecified fetus  O36.80X0  OB < 14 Weeks Single       30 year old , 4 weeks 5 days of pregnancy with OSCAR of 2021.    Per standing orders and scope of practice of this nurse, patient will have the following orders placed and completed prior to initial OB visit with the appropriate provider:    --early ultrasound for dating and viability ordered for 6+ weeks gestation based on LMP    --Quantitative  Beta HCG and progesterone monitoring if indicated    Counseling given:     - Recommended weight gain for pregnancy: 15-25 lbs.   BMI < 18.5  28-40 lbs   18.5 - 24.9 25-35   25 - 29.9 15-25   > 30  < 15       PLAN/PATIENT INSTRUCTIONS:    Follow up in 3-5 weeks.  Normal exercise.  Normal sexual activity.  Prenatal vitamins.  Anticipated weight gain.    follow-up appointment with Dr. Freida Williamson for pre-caridad care and take multivitamin or pre-caridad vitamins    Bethany Diaz RN.................................................. 3/19/2021 8:36 AM

## 2021-04-06 ENCOUNTER — HOSPITAL ENCOUNTER (OUTPATIENT)
Dept: ULTRASOUND IMAGING | Facility: OTHER | Age: 31
End: 2021-04-06
Attending: STUDENT IN AN ORGANIZED HEALTH CARE EDUCATION/TRAINING PROGRAM
Payer: OTHER GOVERNMENT

## 2021-04-06 ENCOUNTER — PRENATAL OFFICE VISIT (OUTPATIENT)
Dept: OBGYN | Facility: OTHER | Age: 31
End: 2021-04-06
Attending: STUDENT IN AN ORGANIZED HEALTH CARE EDUCATION/TRAINING PROGRAM
Payer: OTHER GOVERNMENT

## 2021-04-06 VITALS
HEART RATE: 78 BPM | BODY MASS INDEX: 25.37 KG/M2 | DIASTOLIC BLOOD PRESSURE: 70 MMHG | HEIGHT: 64 IN | SYSTOLIC BLOOD PRESSURE: 122 MMHG | WEIGHT: 148.6 LBS

## 2021-04-06 DIAGNOSIS — O36.80X0 ENCOUNTER TO DETERMINE FETAL VIABILITY OF PREGNANCY, SINGLE OR UNSPECIFIED FETUS: ICD-10-CM

## 2021-04-06 DIAGNOSIS — Z34.81 ENCOUNTER FOR SUPERVISION OF OTHER NORMAL PREGNANCY IN FIRST TRIMESTER: Primary | ICD-10-CM

## 2021-04-06 DIAGNOSIS — Z98.891 HISTORY OF CESAREAN DELIVERY: ICD-10-CM

## 2021-04-06 LAB
ABO + RH BLD: NORMAL
ABO + RH BLD: NORMAL
ALBUMIN UR-MCNC: NEGATIVE MG/DL
APPEARANCE UR: CLEAR
BACTERIA #/AREA URNS HPF: ABNORMAL /HPF
BILIRUB UR QL STRIP: NEGATIVE
BLD GP AB SCN SERPL QL: NORMAL
BLOOD BANK CMNT PATIENT-IMP: NORMAL
C TRACH DNA SPEC QL NAA+PROBE: NOT DETECTED
COLOR UR AUTO: YELLOW
GLUCOSE UR STRIP-MCNC: NEGATIVE MG/DL
HGB UR QL STRIP: NEGATIVE
KETONES UR STRIP-MCNC: NEGATIVE MG/DL
LEUKOCYTE ESTERASE UR QL STRIP: NEGATIVE
N GONORRHOEA DNA SPEC QL NAA+PROBE: NOT DETECTED
NITRATE UR QL: NEGATIVE
PH UR STRIP: 6 PH (ref 5–7)
RBC #/AREA URNS AUTO: <1 /HPF (ref 0–2)
SOURCE: ABNORMAL
SP GR UR STRIP: 1 (ref 1–1.03)
SPECIMEN EXP DATE BLD: NORMAL
SPECIMEN SOURCE: NORMAL
UROBILINOGEN UR STRIP-MCNC: NORMAL MG/DL (ref 0–2)
WBC #/AREA URNS AUTO: 3 /HPF (ref 0–5)

## 2021-04-06 PROCEDURE — 88142 CYTOPATH C/V THIN LAYER: CPT | Performed by: STUDENT IN AN ORGANIZED HEALTH CARE EDUCATION/TRAINING PROGRAM

## 2021-04-06 PROCEDURE — 81001 URINALYSIS AUTO W/SCOPE: CPT | Mod: ZL | Performed by: STUDENT IN AN ORGANIZED HEALTH CARE EDUCATION/TRAINING PROGRAM

## 2021-04-06 PROCEDURE — 87340 HEPATITIS B SURFACE AG IA: CPT | Mod: ZL | Performed by: STUDENT IN AN ORGANIZED HEALTH CARE EDUCATION/TRAINING PROGRAM

## 2021-04-06 PROCEDURE — 87491 CHLMYD TRACH DNA AMP PROBE: CPT | Mod: ZL | Performed by: STUDENT IN AN ORGANIZED HEALTH CARE EDUCATION/TRAINING PROGRAM

## 2021-04-06 PROCEDURE — 86900 BLOOD TYPING SEROLOGIC ABO: CPT | Mod: ZL | Performed by: STUDENT IN AN ORGANIZED HEALTH CARE EDUCATION/TRAINING PROGRAM

## 2021-04-06 PROCEDURE — 86762 RUBELLA ANTIBODY: CPT | Mod: ZL | Performed by: STUDENT IN AN ORGANIZED HEALTH CARE EDUCATION/TRAINING PROGRAM

## 2021-04-06 PROCEDURE — 87086 URINE CULTURE/COLONY COUNT: CPT | Mod: ZL | Performed by: STUDENT IN AN ORGANIZED HEALTH CARE EDUCATION/TRAINING PROGRAM

## 2021-04-06 PROCEDURE — 99207 PR OB VISIT-NO CHARGE - GICH ONLY: CPT | Performed by: STUDENT IN AN ORGANIZED HEALTH CARE EDUCATION/TRAINING PROGRAM

## 2021-04-06 PROCEDURE — 76817 TRANSVAGINAL US OBSTETRIC: CPT

## 2021-04-06 PROCEDURE — 86901 BLOOD TYPING SEROLOGIC RH(D): CPT | Mod: ZL | Performed by: STUDENT IN AN ORGANIZED HEALTH CARE EDUCATION/TRAINING PROGRAM

## 2021-04-06 PROCEDURE — G0123 SCREEN CERV/VAG THIN LAYER: HCPCS | Performed by: STUDENT IN AN ORGANIZED HEALTH CARE EDUCATION/TRAINING PROGRAM

## 2021-04-06 PROCEDURE — 87591 N.GONORRHOEAE DNA AMP PROB: CPT | Mod: ZL | Performed by: STUDENT IN AN ORGANIZED HEALTH CARE EDUCATION/TRAINING PROGRAM

## 2021-04-06 PROCEDURE — 36415 COLL VENOUS BLD VENIPUNCTURE: CPT | Mod: ZL | Performed by: STUDENT IN AN ORGANIZED HEALTH CARE EDUCATION/TRAINING PROGRAM

## 2021-04-06 PROCEDURE — 86850 RBC ANTIBODY SCREEN: CPT | Mod: ZL | Performed by: STUDENT IN AN ORGANIZED HEALTH CARE EDUCATION/TRAINING PROGRAM

## 2021-04-06 PROCEDURE — 86780 TREPONEMA PALLIDUM: CPT | Mod: ZL | Performed by: STUDENT IN AN ORGANIZED HEALTH CARE EDUCATION/TRAINING PROGRAM

## 2021-04-06 PROCEDURE — 87624 HPV HI-RISK TYP POOLED RSLT: CPT | Mod: ZL | Performed by: STUDENT IN AN ORGANIZED HEALTH CARE EDUCATION/TRAINING PROGRAM

## 2021-04-06 ASSESSMENT — PAIN SCALES - GENERAL: PAINLEVEL: NO PAIN (0)

## 2021-04-06 ASSESSMENT — MIFFLIN-ST. JEOR: SCORE: 1379.05

## 2021-04-06 NOTE — ADDENDUM NOTE
Addended by: KIMMIE HENRY on: 4/6/2021 03:55 PM     Modules accepted: Orders     Problem: NORMAL   Goal: Experiences normal transition  Description  INTERVENTIONS:  - Monitor vital signs  - Maintain thermoregulation  - Assess for hypoglycemia risk factors or signs and symptoms  - Assess for sepsis risk factors or signs and symptoms  - Assess for jaundice risk and/or signs and symptoms  Outcome: Progressing  Goal: Total weight loss less than 10% of birth weight  Description  INTERVENTIONS:  - Assess feeding patterns  - Weigh daily  Outcome: Progressing     Problem: Adequate NUTRIENT INTAKE -   Goal: Nutrient/Hydration intake appropriate for improving, restoring or maintaining nutritional needs  Description  INTERVENTIONS:  - Assess growth and nutritional status of patients and recommend course of action  - Monitor nutrient intake, labs, and treatment plans  - Recommend appropriate diets and vitamin/mineral supplements  - Monitor and recommend adjustments to tube feedings and TPN/PPN based on assessed needs  - Provide specific nutrition education as appropriate  Outcome: Progressing  Goal: Breast feeding baby will demonstrate adequate intake  Description  Interventions:  - Monitor/record daily weights and I&O  - Monitor milk transfer  - Increase maternal fluid intake  - Increase breastfeeding frequency and duration  - Teach mother to massage breast before feeding/during infant pauses during feeding  - Pump breast after feeding  - Review breastfeeding discharge plan with mother   Refer to breast feeding support groups  - Initiate discussion/inform physician of weight loss and interventions taken  - Help mother initiate breast feeding within an hour of birth  - Encourage skin to skin time with  within 5 minutes of birth  - Give  no food or drink other than breast milk  - Encourage rooming in  - Encourage breast feeding on demand  - Initiate SLP consult as needed  Outcome: Progressing  Goal: Bottle fed baby will demonstrate adequate intake  Description  Interventions:  - Monitor/record daily weights and I&O  - Increase feeding frequency and volume  - Teach bottle feeding techniques to care provider/s  - Initiate discussion/inform physician of weight loss and interventions taken  - Initiate SLP consult as needed  Outcome: Progressing     Problem: PAIN -   Goal: Displays adequate comfort level or baseline comfort level  Description  INTERVENTIONS:  - Perform pain scoring using age-appropriate tool with hands-on care as needed  Notify physician/AP of high pain scores not responsive to comfort measures  - Administer analgesics based on type and severity of pain and evaluate response  - Sucrose analgesia per protocol for brief minor painful procedures  - Teach parents interventions for comforting infant  Outcome: Progressing     Problem: THERMOREGULATION - /PEDIATRICS  Goal: Maintains normal body temperature  Description  Interventions:  - Monitor temperature (axillary for Newborns) as ordered  - Monitor for signs of hypothermia or hyperthermia  - Provide thermal support measures  - Wean to open crib when appropriate  Outcome: Progressing     Problem: INFECTION -   Goal: No evidence of infection  Description  INTERVENTIONS:  - Instruct family/visitors to use good hand hygiene technique  - Identify and instruct in appropriate isolation precautions for identified infection/condition  - Change incubator every 2 weeks or as needed  - Monitor for symptoms of infection  - Monitor surgical sites and insertion sites for all indwelling lines, tubes, and drains for drainage, redness, or edema   - Monitor endotracheal and nasal secretions for changes in amount and color  - Monitor culture and CBC results  - Administer antibiotics as ordered    Monitor drug levels  Outcome: Progressing     Problem: SAFETY -   Goal: Patient will remain free from falls  Description  INTERVENTIONS:  - Instruct family/caregiver on patient safety  - Keep incubator doors and portholes closed when unattended  - Keep radiant warmer side rails and crib rails up when unattended  - Based on caregiver fall risk screen, instruct family/caregiver to ask for assistance with transferring infant if caregiver noted to have fall risk factors  Outcome: Progressing     Problem: Knowledge Deficit  Goal: Patient/family/caregiver demonstrates understanding of disease process, treatment plan, medications, and discharge instructions  Description  Complete learning assessment and assess knowledge base    Interventions:  - Provide teaching at level of understanding  - Provide teaching via preferred learning methods  Outcome: Progressing  Goal: Infant caregiver verbalizes understanding of benefits of skin-to-skin with healthy   Description  Prior to delivery, educate patient regarding skin-to-skin practice and its benefits  Initiate immediate and uninterrupted skin-to-skin contact after birth until breastfeeding is initiated or a minimum of one hour  Encourage continued skin-to-skin contact throughout the post partum stay    Outcome: Progressing  Goal: Infant caregiver verbalizes understanding of benefits and management of breastfeeding their healthy   Description  Help initiate breastfeeding within one hour of birth  Educate/assist with breastfeeding positioning and latch  Educate on safe positioning and to monitor their  for safety  Educate on how to maintain lactation even if they are  from their   Educate/initiate pumping for a mom with a baby in the NICU within 6 hours after birth  Give infants no food or drink other than breast milk unless medically indicated  Educate on feeding cues and encourage breastfeeding on demand    Outcome: Progressing  Goal: Infant caregiver verbalizes understanding of benefits to rooming-in with their healthy   Description  Promote rooming in 23 out of 24 hours per day  Educate on benefits to rooming-in  Provide  care in room with parents as long as infant and mother condition allow    Outcome: Progressing  Goal: Provide formula feeding instructions and preparation information to caregivers who do not wish to breastfeed their   Description  Provide one on one information on frequency, amount, and burping for formula feeding caregivers throughout their stay and at discharge  Provide written information/video on formula preparation  Outcome: Progressing  Goal: Infant caregiver verbalizes understanding of support and resources for follow up after discharge  Description  Provide individual discharge education on when to call the doctor  Provide resources and contact information for post-discharge support      Outcome: Progressing     Problem: DISCHARGE PLANNING  Goal: Discharge to home or other facility with appropriate resources  Description  INTERVENTIONS:  - Identify barriers to discharge w/patient and caregiver  - Arrange for needed discharge resources and transportation as appropriate  - Identify discharge learning needs (meds, wound care, etc )  - Arrange for interpretive services to assist at discharge as needed  - Refer to Case Management Department for coordinating discharge planning if the patient needs post-hospital services based on physician/advanced practitioner order or complex needs related to functional status, cognitive ability, or social support system  Outcome: Progressing     Problem: NORMAL   Goal: Experiences normal transition  Description  INTERVENTIONS:  - Monitor vital signs  - Maintain thermoregulation  - Assess for hypoglycemia risk factors or signs and symptoms  - Assess for sepsis risk factors or signs and symptoms  - Assess for jaundice risk and/or signs and symptoms  2019 2239 by Nakul Dennis, RN  Outcome: Progressing  2019 2238 by Nakul Dennis, RN  Outcome: Progressing  Goal: Total weight loss less than 10% of birth weight  Description  INTERVENTIONS:  - Assess feeding patterns  - Weigh daily  2019 2239 by Spenser Hu RN  Outcome: Progressing  2019 2238 by Spenser Hu RN  Outcome: Progressing     Problem: Adequate NUTRIENT INTAKE -   Goal: Nutrient/Hydration intake appropriate for improving, restoring or maintaining nutritional needs  Description  INTERVENTIONS:  - Assess growth and nutritional status of patients and recommend course of action  - Monitor nutrient intake, labs, and treatment plans  - Recommend appropriate diets and vitamin/mineral supplements  - Monitor and recommend adjustments to tube feedings and TPN/PPN based on assessed needs  - Provide specific nutrition education as appropriate  2019 2239 by Spenser Hu RN  Outcome: Progressing  2019 2238 by Spenser Hu RN  Outcome: Progressing  Goal: Breast feeding baby will demonstrate adequate intake  Description  Interventions:  - Monitor/record daily weights and I&O  - Monitor milk transfer  - Increase maternal fluid intake  - Increase breastfeeding frequency and duration  - Teach mother to massage breast before feeding/during infant pauses during feeding  - Pump breast after feeding  - Review breastfeeding discharge plan with mother   Refer to breast feeding support groups  - Initiate discussion/inform physician of weight loss and interventions taken  - Help mother initiate breast feeding within an hour of birth  - Encourage skin to skin time with  within 5 minutes of birth  - Give  no food or drink other than breast milk  - Encourage rooming in  - Encourage breast feeding on demand  - Initiate SLP consult as needed  2019 2239 by Spenser Hu RN  Outcome: Progressing  2019 2238 by Spenser Hu RN  Outcome: Progressing  Goal: Bottle fed baby will demonstrate adequate intake  Description  Interventions:  - Monitor/record daily weights and I&O  - Increase feeding frequency and volume  - Teach bottle feeding techniques to care provider/s  - Initiate discussion/inform physician of weight loss and interventions taken  - Initiate SLP consult as needed  2019 2239 by Amarilys Alejandro RN  Outcome: Progressing  2019 2238 by Amarilys Alejandro RN  Outcome: Progressing     Problem: PAIN -   Goal: Displays adequate comfort level or baseline comfort level  Description  INTERVENTIONS:  - Perform pain scoring using age-appropriate tool with hands-on care as needed  Notify physician/AP of high pain scores not responsive to comfort measures  - Administer analgesics based on type and severity of pain and evaluate response  - Sucrose analgesia per protocol for brief minor painful procedures  - Teach parents interventions for comforting infant  2019 2239 by Amarilys Alejandro RN  Outcome: Progressing  2019 2238 by Amarilys Alejandro RN  Outcome: Progressing     Problem: THERMOREGULATION - /PEDIATRICS  Goal: Maintains normal body temperature  Description  Interventions:  - Monitor temperature (axillary for Newborns) as ordered  - Monitor for signs of hypothermia or hyperthermia  - Provide thermal support measures  - Wean to open crib when appropriate  2019 2239 by Amarilys Alejandro RN  Outcome: Progressing  2019 2238 by Amarilys Alejandro RN  Outcome: Progressing     Problem: INFECTION -   Goal: No evidence of infection  Description  INTERVENTIONS:  - Instruct family/visitors to use good hand hygiene technique  - Identify and instruct in appropriate isolation precautions for identified infection/condition  - Change incubator every 2 weeks or as needed  - Monitor for symptoms of infection  - Monitor surgical sites and insertion sites for all indwelling lines, tubes, and drains for drainage, redness, or edema   - Monitor endotracheal and nasal secretions for changes in amount and color  - Monitor culture and CBC results  - Administer antibiotics as ordered    Monitor drug levels  2019 2239 by Amarilys Alejandro RN  Outcome: Progressing  2019 2238 by Eamon Baker GISELA De La Rosa  Outcome: Progressing     Problem: SAFETY -   Goal: Patient will remain free from falls  Description  INTERVENTIONS:  - Instruct family/caregiver on patient safety  - Keep incubator doors and portholes closed when unattended  - Keep radiant warmer side rails and crib rails up when unattended  - Based on caregiver fall risk screen, instruct family/caregiver to ask for assistance with transferring infant if caregiver noted to have fall risk factors  2019 2239 by Susan Alejandra RN  Outcome: Progressing  2019 2238 by Susan Alejandra RN  Outcome: Progressing     Problem: Knowledge Deficit  Goal: Patient/family/caregiver demonstrates understanding of disease process, treatment plan, medications, and discharge instructions  Description  Complete learning assessment and assess knowledge base    Interventions:  - Provide teaching at level of understanding  - Provide teaching via preferred learning methods  2019 2239 by Susan Alejandra RN  Outcome: Progressing  2019 2238 by Susan Alejandra RN  Outcome: Progressing  Goal: Infant caregiver verbalizes understanding of benefits of skin-to-skin with healthy   Description  Prior to delivery, educate patient regarding skin-to-skin practice and its benefits  Initiate immediate and uninterrupted skin-to-skin contact after birth until breastfeeding is initiated or a minimum of one hour  Encourage continued skin-to-skin contact throughout the post partum stay    2019 2239 by Susan Alejandra RN  Outcome: Progressing  2019 2238 by Susan Alejandra RN  Outcome: Progressing  Goal: Infant caregiver verbalizes understanding of benefits and management of breastfeeding their healthy   Description  Help initiate breastfeeding within one hour of birth  Educate/assist with breastfeeding positioning and latch  Educate on safe positioning and to monitor their  for safety  Educate on how to maintain lactation even if they are  from their   Educate/initiate pumping for a mom with a baby in the NICU within 6 hours after birth  Give infants no food or drink other than breast milk unless medically indicated  Educate on feeding cues and encourage breastfeeding on demand    2019 2239 by Tiffanie Almodovar RN  Outcome: Progressing  2019 2238 by Tiffanie Almodovar RN  Outcome: Progressing  Goal: Infant caregiver verbalizes understanding of benefits to rooming-in with their healthy   Description  Promote rooming in 23 out of 24 hours per day  Educate on benefits to rooming-in  Provide  care in room with parents as long as infant and mother condition allow    2019 2239 by Tiffanie Almodovar RN  Outcome: Progressing  2019 2238 by Tiffanie Almodovar RN  Outcome: Progressing  Goal: Provide formula feeding instructions and preparation information to caregivers who do not wish to breastfeed their   Description  Provide one on one information on frequency, amount, and burping for formula feeding caregivers throughout their stay and at discharge  Provide written information/video on formula preparation  2019 2239 by Tiffanie Almodovar RN  Outcome: Progressing  2019 2238 by Tiffanie Almodovar RN  Outcome: Progressing  Goal: Infant caregiver verbalizes understanding of support and resources for follow up after discharge  Description  Provide individual discharge education on when to call the doctor  Provide resources and contact information for post-discharge support      2019 2239 by Tiffanie Almodovar RN  Outcome: Progressing  2019 2238 by Tiffanie Almodovar RN  Outcome: Progressing     Problem: DISCHARGE PLANNING  Goal: Discharge to home or other facility with appropriate resources  Description  INTERVENTIONS:  - Identify barriers to discharge w/patient and caregiver  - Arrange for needed discharge resources and transportation as appropriate  - Identify discharge learning needs (meds, wound care, etc )  - Arrange for interpretive services to assist at discharge as needed  - Refer to Case Management Department for coordinating discharge planning if the patient needs post-hospital services based on physician/advanced practitioner order or complex needs related to functional status, cognitive ability, or social support system  2019 2239 by Lelo Bynum, RN  Outcome: Progressing  2019 2238 by Lelo Bynum, RN  Outcome: Progressing

## 2021-04-06 NOTE — NURSING NOTE
Pt presents to clinic today for Ob physical 7w2d. Pt denies any cramping or bleeding at this time.      Medication Reconciliation: complete  Erika Martinez LPN

## 2021-04-06 NOTE — PROGRESS NOTES
New OB Visit    Chief Complaint: Establish care for a new pregnancy    History of Present Illness:  Ms. Lianne Martinez is a 30 year old yo  here today for a new OB visit. She reports her LMP as Patient's last menstrual period was 2021. She is sure of this date. She took a positive pregnancy test around . She reports early pregnancy symptoms including nausea &/or vomiting. She IS taking prenatal vitamins at this time. This pregnancy was planned. The patient reports that she is excited about the pregnancy. Father of the baby, Chaim, is involved and at the visit today.      We discussed some warning signs to be alert for that could suggest spontaneous miscarriage including vaginal bleeding, cramping as well as what symptoms should prompt medical evaluation in clinic or the ER.     We also discussed genetic screening including Winn non-invasive testing, carrier screening for SMA and CF and the Quad screen. All of these tests were offered to the family and they have decided on Winn at this time.     Medical History:  Past Medical History:   Diagnosis Date     Family history of genetic disease     mother  in her 30s of primary biliary cirrhosis. LFTs 18 WNL      IBS (irritable bowel syndrome)      Vulvodynia 2018     She denies any chronic medical conditions: specifically denies asthma, HTN, DM    Obstetric History:   : PLTCS for breech presentation. Baby Boy.   G2: current    GYN History:  No history of STIs  Last pap smear: approximately 2 years ago.   No history of abnormal pap smears    Past Surgical History:  Past Surgical History:   Procedure Laterality Date     C/SECTION, LOW TRANSVERSE  10/03/2015   Recovery with mild incision pain. Left sided pain    Family Medical History:  Family History   Problem Relation Age of Onset     Cirrhosis Mother      Liver Disease Mother      Liver Disease Maternal Aunt      Cirrhosis Maternal Aunt    Mother  in her 30s due to  "primary biliary cirrhosis  She has a second cousin with PBC    Specifically denies breast, ovarian, endometrial and colon cancers in her family  She also is not aware of any familial thrombophilias and coagulopathies in her family    Medications:  Current Outpatient Medications   Medication     Multiple Vitamins-Minerals (MULTIVITAMIN ADULT PO)     No current facility-administered medications for this visit.        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    ROS:   Skin: negative for rash, bruising  Eyes: negative for visual blurring, double vision  Ears/Nose/Throat: negative for nasal congestion, vertigo  Respiratory: No shortness of breath, dyspnea on exertion, cough, or hemoptysis  Cardiovascular: negative for palpitations, chest pain, lower extremity edema and syncope or near-syncope  Gastrointestinal: negative for, nausea, vomiting and hematemesis  Genitourinary: negative for, dysuria, frequency and urgency  Musculoskeletal: negative for, back pain and muscular weakness  Neurologic: negative for, headaches, syncope, seizures and local weakness  Psychiatric: negative for, anxiety, depression and hallucinations  Hematologic/Lymphatic/Immunologic: negative for, anemia, chills and fever    Exam:  /70   Pulse 78   Ht 1.626 m (5' 4\")   Wt 67.4 kg (148 lb 9.6 oz)   LMP 02/14/2021   Breastfeeding No   BMI 25.51 kg/m      General: No acute distress, well-appearing  Neck: Normal thyroid gland on palpation without nodules, enlargement or pain  Breast: Normal appearing skin on breasts bilaterally, No nodules or masses palpated, " no nipple discharge or bleeding  Cardiac: Normal rate, regular rhythm, no murmurs, gallops or rubs, normal perfusion to extremities  Lungs: Clear on auscultation, no wheezing, non-labored respirations  Abdomen: Soft, non-tender, no masses  Pelvic exam: Normal appearing external genitalia, normal appearing vaginal walls with pink ruggae. No noted vaginal discharge or lesions. Cervix is closed without masses, nodules, erythema or discharge at the os.     Labs:    Dating ultrasound: Done    FINDINGS: There is a gestational sac within the uterus. This contains  a yolk sac and an embryo with cardiac activity and a heart rate of 129  bpm. Based on a crown-rump length of 9.1 mm estimated age of 7 weeks 0  days. EDC by ultrasound is 2021.                                                                   IMPRESSION: Early OB ultrasound with estimated age of 7 weeks 0 days    OSCAR based on LMP 2021- OSCAR based on US 2021. Per ACOG recommendations these dates are not more than 5 days different, and thus the dating should not be changed based on US. Final OSCAR will be 2021 based on LMP= 7 week US.    Assessment:  Ms. Lianne Martinez is a 30 year old yo  here today to establish care for this pregnancy. Her pregnancy is complicated by history of prior  section.    Plan:  # Routine Prenatal Care  -- Dating: LMP=7 week US OSCAR: 2021  -- PNLs: collected today including the following   CBC   RPR   Hep B S Ag   Rubella   HIV   ABO/Rh type   Antibody Screen    -- Genetic Screening: Discussed with patient, she has decided on Mikana at next visit.  -- Anatomy US: Planned for approximately 20 weeks gestation.  -- Immunizations: Will ask if she would like influenza vaccine and Tdap at approximately 27 weeks gestation  -- 3rd TM labs including CBC, RPR: Planned for after 27 weeks gestation  -- 1 hr GTT: Planned for  24-28 weeks   -- GBS: Planned for 36 weeks  -- Postpartum Planning: To be discussed    -- Delivery Planning: Plan for RCS at 39 weeks  -- Return to clinic in 4 weeks for OB follow up visit  -- Planning to do at next visit: Follow up genetic screening decision, Opheim test, flu shot?    # Previous CS  -- Breech presentation  -- Plans for repeat at 39 weeks    # B NEG blood type  -- Will need rhogam at 28 weeks gestation    KOREY SAINZ MD on 4/6/2021 at 3:24 PM

## 2021-04-08 LAB
HBV SURFACE AG SERPL QL IA: NONREACTIVE
RUBV IGG SERPL IA-ACNC: 12 IU/ML
T PALLIDUM AB SER QL: NONREACTIVE

## 2021-04-09 LAB
BACTERIA SPEC CULT: NO GROWTH
COPATH REPORT: NORMAL
PAP: NORMAL
SPECIMEN SOURCE: NORMAL

## 2021-04-13 ENCOUNTER — TELEPHONE (OUTPATIENT)
Dept: OBGYN | Facility: OTHER | Age: 31
End: 2021-04-13

## 2021-04-13 NOTE — TELEPHONE ENCOUNTER
Returned patients call and answered questions regarding her next appointment and gave her the number for the [facility] Specialist per her request. She has no further questions or concerns at this time.  Margie Garrison RN on 4/13/2021 at 2:05 PM

## 2021-04-13 NOTE — TELEPHONE ENCOUNTER
Returned patients call and gave her the CPT code. No further questions or concerns at this time.  Margie Garrison RN on 4/13/2021 at 3:08 PM

## 2021-04-13 NOTE — TELEPHONE ENCOUNTER
Pt. Is requesting a call back regarding their upcoming appt and the Rake test. Call back phone number is [phone]

## 2021-04-16 LAB
FINAL DIAGNOSIS: NORMAL
HPV HR 12 DNA CVX QL NAA+PROBE: NEGATIVE
HPV16 DNA SPEC QL NAA+PROBE: NEGATIVE
HPV18 DNA SPEC QL NAA+PROBE: NEGATIVE
SPECIMEN DESCRIPTION: NORMAL
SPECIMEN SOURCE CVX/VAG CYTO: NORMAL

## 2021-04-26 ENCOUNTER — PRENATAL OFFICE VISIT (OUTPATIENT)
Dept: OBGYN | Facility: OTHER | Age: 31
End: 2021-04-26
Attending: OBSTETRICS & GYNECOLOGY
Payer: OTHER GOVERNMENT

## 2021-04-26 ENCOUNTER — TELEPHONE (OUTPATIENT)
Dept: OBGYN | Facility: OTHER | Age: 31
End: 2021-04-26

## 2021-04-26 VITALS
HEART RATE: 80 BPM | DIASTOLIC BLOOD PRESSURE: 72 MMHG | WEIGHT: 151.8 LBS | BODY MASS INDEX: 26.06 KG/M2 | RESPIRATION RATE: 20 BRPM | SYSTOLIC BLOOD PRESSURE: 124 MMHG

## 2021-04-26 DIAGNOSIS — Z34.90 EARLY STAGE OF PREGNANCY: Primary | ICD-10-CM

## 2021-04-26 DIAGNOSIS — O20.9 BLEEDING IN EARLY PREGNANCY: Primary | ICD-10-CM

## 2021-04-26 LAB
ABO + RH BLD: NORMAL
ABO + RH BLD: NORMAL
BLD GP AB SCN SERPL QL: NORMAL
BLOOD BANK CMNT PATIENT-IMP: NORMAL
BLOOD BANK CMNT PATIENT-IMP: NORMAL
DATE RH IMM GL GVN: NORMAL
FETAL CELL SCN BLD QL ROSETTE: NORMAL
RH IG VIALS RECOM PATIENT: NORMAL

## 2021-04-26 PROCEDURE — 36415 COLL VENOUS BLD VENIPUNCTURE: CPT | Mod: ZL | Performed by: OBSTETRICS & GYNECOLOGY

## 2021-04-26 PROCEDURE — 86901 BLOOD TYPING SEROLOGIC RH(D): CPT | Mod: ZL | Performed by: OBSTETRICS & GYNECOLOGY

## 2021-04-26 PROCEDURE — 99207 PR OB VISIT-NO CHARGE - GICH ONLY: CPT | Performed by: OBSTETRICS & GYNECOLOGY

## 2021-04-26 PROCEDURE — 96372 THER/PROPH/DIAG INJ SC/IM: CPT | Performed by: OBSTETRICS & GYNECOLOGY

## 2021-04-26 PROCEDURE — 86850 RBC ANTIBODY SCREEN: CPT | Mod: ZL | Performed by: OBSTETRICS & GYNECOLOGY

## 2021-04-26 PROCEDURE — 86900 BLOOD TYPING SEROLOGIC ABO: CPT | Mod: ZL | Performed by: OBSTETRICS & GYNECOLOGY

## 2021-04-26 PROCEDURE — 250N000011 HC RX IP 250 OP 636: Performed by: OBSTETRICS & GYNECOLOGY

## 2021-04-26 PROCEDURE — 85461 HEMOGLOBIN FETAL: CPT | Mod: ZL | Performed by: OBSTETRICS & GYNECOLOGY

## 2021-04-26 RX ADMIN — RHO(D) IMMUNE GLOBULIN (HUMAN) 300 MCG: 1500 SOLUTION INTRAMUSCULAR at 12:30

## 2021-04-26 ASSESSMENT — PAIN SCALES - GENERAL: PAINLEVEL: NO PAIN (0)

## 2021-04-26 NOTE — PROGRESS NOTES
CC: Recheck OB visit at 10w1d    HPI: Lianne Martinez presents as an urgent work in for spotting in early pregnancy with some cramping the last two days.  She is Rh neg.    OB History    Para Term  AB Living   2 1 1 0 0 1   SAB TAB Ectopic Multiple Live Births   0 0 0 0 1      # Outcome Date GA Lbr Sukhi/2nd Weight Sex Delivery Anes PTL Lv   2 Current            1 Term 10/03/15    M CS-Unspec   COURTNEY      Name: Panda     Current Outpatient Medications   Medication     Multiple Vitamins-Minerals (MULTIVITAMIN ADULT PO)     Current Facility-Administered Medications   Medication     rho(D) immune globulin (RHOGAM) injection 300 mcg     rho(D) immune globulin (RHOGAM) injection 300 mcg         O: /72 (BP Location: Right arm, Patient Position: Sitting, Cuff Size: Adult Regular)   Pulse 80   Resp 20   Wt 68.9 kg (151 lb 12.8 oz)   LMP 2021   Breastfeeding No   BMI 26.06 kg/m    Body mass index is 26.06 kg/m .  See OB flow sheet  EXAM:  NAD  Bedside US shows a viable IUP with normal fetal cardiac activity in the 160's  Cervix is LTC with scant    Results for orders placed or performed in visit on 21   RHOGAM ORDER     Status: None   Result Value Ref Range    Rhogam Order Order received        A/P: (O20.9) Bleeding in early pregnancy  (primary encounter diagnosis)  Comment:   Plan: RHOGAM ORDER, rho(D) immune globulin (RHOGAM)         injection 300 mcg            Recheck in 1 week      Problem List:   Prior LTCS for breech, plans repeat  Bleeding in early pregnancy, rhogam given 2021  B neg BT    Warren Escobar MD FACOG  12:06 PM 2021

## 2021-04-26 NOTE — NURSING NOTE
"Chief Complaint   Patient presents with     Prenatal Care     10w 1d- concerns     Patient stated \"for the last 4 mornings been waking up with mild cramping- usual subside within a hour. Had a pink smear on toilet paper this morning, and then had a little brown discharge shortly before appointment.\"    Initial /72 (BP Location: Right arm, Patient Position: Sitting, Cuff Size: Adult Regular)   Pulse 80   Resp 20   Wt 68.9 kg (151 lb 12.8 oz)   LMP 02/14/2021   Breastfeeding No   BMI 26.06 kg/m   Estimated body mass index is 26.06 kg/m  as calculated from the following:    Height as of 4/6/21: 1.626 m (5' 4\").    Weight as of this encounter: 68.9 kg (151 lb 12.8 oz).  Medication Reconciliation: Completed     Barber Pierre LPN  "

## 2021-04-26 NOTE — TELEPHONE ENCOUNTER
Patient called with complaint of mild cramping x 4 days and now today has started to have some spotting, States the cramping is not as bad as her normal period type cramping. Patient is B neg. Patient advised to be seen today and transferred to unit 5  to see Dr. COURTNEY Garrison or Dr. Escobar as Dr. Funez is not in clinic today. Patient voiced understanding and agrees with plan of care. No further questions or concerns at this time.   Margie Garrison RN on 4/26/2021 at 8:46 AM

## 2021-05-05 NOTE — PROGRESS NOTES
Return OB Visit    S: Ms. Martinez is feeling well today. She has no acute concerns. Denies leaking of fluid, vaginal bleeding, painful contractions. She was seen by Dr. Escobar on  due to some noted spotting. She was given a dose of Rhogam at that time. She notes that her spotting has improved. She had a few days more after her last visit. She notes it as resolved. She continues to have nausea, but notes it is manageable.    O:   /76   Pulse 88   Wt 67.6 kg (149 lb)   LMP 2021   BMI 25.58 kg/m      Gen: Well-appearing, NAD  See OB Flowsheet    FH : NA   bpm    Assessment:  Ms. Lianne Martinez is a 30 year old yo  here today for an OB follow up. She is currently 11w4d. Her pregnancy is complicated by history of prior  section.     Plan:  # Routine Prenatal Care  -- Dating: LMP=7 week US OSCAR: 2021  -- PNLs:               B NEG, AB screen neg              RPR nr              Hep B S Ag neg              Rubella Immune              HIV neg in 2021              Pap: NIL, HPV negative   GC/Chlam neg     -- Genetic Screening: Declined  -- Anatomy US: Planned for approximately 20 weeks gestation.  -- Immunizations: Tdap at approximately 27 weeks gestation  -- 3rd TM labs including CBC, RPR: Planned for after 27 weeks gestation  -- 1 hr GTT: Planned for  24-28 weeks   -- GBS: Planned for 36 weeks  -- Postpartum Planning: To be discussed   -- Delivery Planning: Plan for RCS at 39 weeks  -- Return to clinic in 4 weeks for OB follow up visit  -- Planning to do at next visit: schedule anatomy US     # Previous CS due to breech  -- Plans for repeat at 39 weeks     # B NEG blood type  -- Will need rhogam at 28 weeks gestation   -- s/p rhogam on  due to vaginal spotting

## 2021-05-06 ENCOUNTER — PRENATAL OFFICE VISIT (OUTPATIENT)
Dept: OBGYN | Facility: OTHER | Age: 31
End: 2021-05-06
Attending: STUDENT IN AN ORGANIZED HEALTH CARE EDUCATION/TRAINING PROGRAM
Payer: OTHER GOVERNMENT

## 2021-05-06 VITALS
WEIGHT: 149 LBS | HEART RATE: 88 BPM | BODY MASS INDEX: 25.58 KG/M2 | SYSTOLIC BLOOD PRESSURE: 112 MMHG | DIASTOLIC BLOOD PRESSURE: 76 MMHG

## 2021-05-06 DIAGNOSIS — Z34.81 ENCOUNTER FOR SUPERVISION OF OTHER NORMAL PREGNANCY IN FIRST TRIMESTER: Primary | ICD-10-CM

## 2021-05-06 PROCEDURE — 99207 PR OB VISIT-NO CHARGE - GICH ONLY: CPT | Performed by: STUDENT IN AN ORGANIZED HEALTH CARE EDUCATION/TRAINING PROGRAM

## 2021-05-06 NOTE — NURSING NOTE
Pt presents to clinic today for prenatal care 11w4d. Pt has had intermitting cramping and spotting      Medication Reconciliation: complete  Erika Martinez LPN

## 2021-05-26 NOTE — PROGRESS NOTES
Return OB Visit    S: Ms. Martinez is feeling well today. She has no acute concerns. Denies leaking of fluid, vaginal bleeding, painful contractions.     O:   /66   Pulse 88   Wt 68.5 kg (151 lb)   LMP 2021   BMI 25.92 kg/m    Gen: Well-appearing, NAD  See OB Flowsheet    FH: NA   bpm    Assessment:  Ms. Lianne Martinez is a 30 year old yo  here today for an OB follow up. She is currently 15w2d. Her pregnancy is complicated by history of prior  section.     Plan:  # Routine Prenatal Care  -- Dating: LMP=7 week US OSCAR: 2021  -- PNLs:               B NEG, AB screen neg              RPR nr              Hep B S Ag neg              Rubella Immune              HIV neg in 2021                         Pap: NIL, HPV negative              GC/Chlam neg     -- Genetic Screening: Declined  -- Anatomy US: Planned for approximately 20 weeks gestation: ordered  -- Immunizations: Tdap at approximately 27 weeks gestation  -- 3rd TM labs including CBC, RPR: Planned for after 27 weeks gestation  -- 1 hr GTT: Planned for  24-28 weeks   -- GBS: Planned for 36 weeks  -- Postpartum Planning: To be discussed   -- Delivery Planning: Plan for RCS at 39 weeks  -- Return to clinic in 4 weeks for OB follow up visit  -- Planning to do at next visit: follow up growth US     # Previous CS due to breech  -- Plans for repeat at 39 weeks     # B NEG blood type  -- Will need rhogam at 28 weeks gestation   -- s/p rhogam on  due to vaginal spotting

## 2021-06-01 ENCOUNTER — PRENATAL OFFICE VISIT (OUTPATIENT)
Dept: OBGYN | Facility: OTHER | Age: 31
End: 2021-06-01
Attending: STUDENT IN AN ORGANIZED HEALTH CARE EDUCATION/TRAINING PROGRAM
Payer: OTHER GOVERNMENT

## 2021-06-01 VITALS
DIASTOLIC BLOOD PRESSURE: 66 MMHG | BODY MASS INDEX: 25.92 KG/M2 | SYSTOLIC BLOOD PRESSURE: 112 MMHG | HEART RATE: 88 BPM | WEIGHT: 151 LBS

## 2021-06-01 DIAGNOSIS — Z98.891 HISTORY OF CESAREAN DELIVERY: ICD-10-CM

## 2021-06-01 DIAGNOSIS — Z34.92 SECOND TRIMESTER PREGNANCY: Primary | ICD-10-CM

## 2021-06-01 PROCEDURE — 99207 PR OB VISIT-NO CHARGE - GICH ONLY: CPT | Performed by: STUDENT IN AN ORGANIZED HEALTH CARE EDUCATION/TRAINING PROGRAM

## 2021-06-01 ASSESSMENT — PAIN SCALES - GENERAL: PAINLEVEL: NO PAIN (0)

## 2021-06-01 NOTE — NURSING NOTE
Pt presents to clinic today for prenatal care 15w2d. Pt denies any  bleeding, or leakage of fluid at this time.        Medication Reconciliation: complete  Erika Martinez LPN

## 2021-06-23 ENCOUNTER — PRENATAL OFFICE VISIT (OUTPATIENT)
Dept: OBGYN | Facility: OTHER | Age: 31
End: 2021-06-23
Attending: STUDENT IN AN ORGANIZED HEALTH CARE EDUCATION/TRAINING PROGRAM
Payer: OTHER GOVERNMENT

## 2021-06-23 ENCOUNTER — HOSPITAL ENCOUNTER (OUTPATIENT)
Dept: ULTRASOUND IMAGING | Facility: OTHER | Age: 31
End: 2021-06-23
Attending: STUDENT IN AN ORGANIZED HEALTH CARE EDUCATION/TRAINING PROGRAM
Payer: OTHER GOVERNMENT

## 2021-06-23 VITALS
WEIGHT: 155.2 LBS | SYSTOLIC BLOOD PRESSURE: 108 MMHG | DIASTOLIC BLOOD PRESSURE: 68 MMHG | BODY MASS INDEX: 26.64 KG/M2 | HEART RATE: 82 BPM

## 2021-06-23 DIAGNOSIS — Z34.92 SECOND TRIMESTER PREGNANCY: ICD-10-CM

## 2021-06-23 DIAGNOSIS — Z98.891 HISTORY OF CESAREAN DELIVERY: Primary | ICD-10-CM

## 2021-06-23 PROCEDURE — 76805 OB US >/= 14 WKS SNGL FETUS: CPT

## 2021-06-23 PROCEDURE — 99207 PR OB VISIT-NO CHARGE - GICH ONLY: CPT | Performed by: STUDENT IN AN ORGANIZED HEALTH CARE EDUCATION/TRAINING PROGRAM

## 2021-06-23 RX ORDER — PROCHLORPERAZINE MALEATE 10 MG
10 TABLET ORAL EVERY 6 HOURS PRN
Qty: 30 TABLET | Refills: 0 | Status: SHIPPED | OUTPATIENT
Start: 2021-06-23 | End: 2021-09-01

## 2021-06-23 ASSESSMENT — PAIN SCALES - GENERAL: PAINLEVEL: NO PAIN (0)

## 2021-06-23 NOTE — PROGRESS NOTES
Return OB Visit    S: Ms. Martinez is feeling well today. She has no acute concerns. Denies leaking of fluid, vaginal bleeding, painful contractions. Notes fetal movements.    O:   /68   Pulse 82   Wt 70.4 kg (155 lb 3.2 oz)   LMP 2021   BMI 26.64 kg/m      Gen: Well-appearing, NAD    FH : NA  FHT: Growth US today    Assessment:  Ms. Lianne Martinez is a 30 year old yo  here today for an OB follow up. She is currently 18w3d. Her pregnancy is complicated by history of prior  section.     Plan:  # Routine Prenatal Care  -- Dating: LMP=7 week US OSCAR: 2021  -- PNLs:               B NEG, AB screen neg              RPR nr              Hep B S Ag neg              Rubella Immune              HIV neg in 2021                         Pap: NIL, HPV negative              GC/Chlam neg     -- Genetic Screening: Declined  -- Anatomy US: today, results pending  -- Immunizations: Tdap at approximately 27 weeks gestation  -- 3rd TM labs including CBC, RPR: Planned for after 27 weeks gestation  -- 1 hr GTT: Planned for  24-28 weeks   -- GBS: Planned for 36 weeks  -- Postpartum Planning: To be discussed   -- Delivery Planning: Plan for RCS at 39 weeks  -- Return to clinic in 4 weeks for OB follow up visit  -- Planning to do at next visit: discuss upcoming 1 hr GTT     # Previous CS due to breech  -- Plans for repeat at 39 weeks     # B NEG blood type  -- Will need rhogam at 28 weeks gestation   -- s/p rhogam on  due to vaginal spotting

## 2021-06-23 NOTE — NURSING NOTE
Pt presents to clinic today for prenatal care 18w3d. Pt denies any contractions, bleeding, or leakage of fluid at this time.      Medication Reconciliation: complete  Erika Martinez LPN

## 2021-06-24 DIAGNOSIS — G93.0 CHOROID PLEXUS CYST: Primary | ICD-10-CM

## 2021-07-08 ENCOUNTER — TELEPHONE (OUTPATIENT)
Dept: OBGYN | Facility: OTHER | Age: 31
End: 2021-07-08

## 2021-07-08 NOTE — TELEPHONE ENCOUNTER
Call returned to patient who states that she had some pain around 0930. She has sharp pain on her lower right side. (Lower right quadrant area) She states that at lunch time it moved to her her back. She thought that she maybe had constipation but her stool was soft. Stool was normal color. She denies fever and is not nauseous at this time but did earlier. She has not had this pain before she reports this pain in mild. She denies abnormal discharge and baby is moving normally. She has had some round ligament pain in the past but states that this feels different than that in her opinion. She states nothing really changes the pain it stays the same. Discussed that she should monitor at home and if the pain gets worse, changes significantly, or is not letting up she should be evaluated. She will also monitor for abnormalities with baby. Discussed with Dr. Funez and she is in agreement with this plan.     Jody Jim RN on 7/8/2021 at 4:03 PM

## 2021-07-21 ENCOUNTER — PRENATAL OFFICE VISIT (OUTPATIENT)
Dept: OBGYN | Facility: OTHER | Age: 31
End: 2021-07-21
Attending: STUDENT IN AN ORGANIZED HEALTH CARE EDUCATION/TRAINING PROGRAM
Payer: OTHER GOVERNMENT

## 2021-07-21 VITALS
BODY MASS INDEX: 27.28 KG/M2 | SYSTOLIC BLOOD PRESSURE: 112 MMHG | DIASTOLIC BLOOD PRESSURE: 62 MMHG | HEART RATE: 90 BPM | WEIGHT: 158.9 LBS

## 2021-07-21 DIAGNOSIS — Z34.92 SECOND TRIMESTER PREGNANCY: ICD-10-CM

## 2021-07-21 DIAGNOSIS — Z98.891 HISTORY OF CESAREAN DELIVERY: ICD-10-CM

## 2021-07-21 DIAGNOSIS — G93.0 CHOROID PLEXUS CYST: Primary | ICD-10-CM

## 2021-07-21 PROCEDURE — 99207 PR OB VISIT-NO CHARGE - GICH ONLY: CPT | Performed by: STUDENT IN AN ORGANIZED HEALTH CARE EDUCATION/TRAINING PROGRAM

## 2021-07-21 NOTE — NURSING NOTE
Pt presents to clinic today for prenatal care 22w3d. Pt denies any contractions, bleeding, or leakage of fluid at this time.     Medication Reconciliation: complete  Erika Martinez LPN

## 2021-07-21 NOTE — PROGRESS NOTES
Return OB Visit    S: Ms. Martinez is feeling well today. She has no acute concerns. Denies leaking of fluid, vaginal bleeding, painful contractions. Notes fetal movements.    O:   /62   Pulse 90   Wt 72.1 kg (158 lb 14.4 oz)   LMP 2021   BMI 27.28 kg/m      Gen: Well-appearing, NAD  See OB Flowsheet    FH 23 cm   bpm    Assessment:  Ms. Lianne Martinez is a 30 year old yo  here today for an OB follow up. She is currently 22w3d. Her pregnancy is complicated by history of prior  section.     Plan:  # Routine Prenatal Care  -- Dating: LMP=7 week US OSCAR: 2021  -- PNLs:               B NEG, AB screen neg              RPR nr              Hep B S Ag neg              Rubella Immune              HIV neg in 2021                         Pap: NIL, HPV negative              GC/Chlam neg     -- Genetic Screening: Declined  -- Anatomy US: : Questionable left choroid cyst, follow up MFM scan ordered  -- Immunizations: Tdap at approximately 27 weeks gestation  -- 3rd TM labs including CBC, RPR: Planned for after 27 weeks gestation  -- 1 hr GTT: Planned for  24-28 weeks   -- GBS: Planned for 36 weeks  -- Postpartum Planning: To be discussed   -- Delivery Planning: Plan for RCS at 39 weeks  -- Return to clinic in 4 weeks for OB follow up visit  -- Planning to do at next visit: discuss upcoming 1 hr GTT     # Previous CS due to breech  -- Plans for repeat at 39 weeks     # B NEG blood type  -- Will need rhogam at 28 weeks gestation   -- s/p rhogam on  due to vaginal spotting

## 2021-07-27 ENCOUNTER — HOSPITAL ENCOUNTER (OUTPATIENT)
Dept: ULTRASOUND IMAGING | Facility: OTHER | Age: 31
Discharge: HOME | End: 2021-07-27
Attending: STUDENT IN AN ORGANIZED HEALTH CARE EDUCATION/TRAINING PROGRAM | Admitting: STUDENT IN AN ORGANIZED HEALTH CARE EDUCATION/TRAINING PROGRAM
Payer: OTHER GOVERNMENT

## 2021-07-27 ENCOUNTER — OFFICE VISIT (OUTPATIENT)
Dept: MATERNAL FETAL MEDICINE | Facility: CLINIC | Age: 31
End: 2021-07-27
Attending: STUDENT IN AN ORGANIZED HEALTH CARE EDUCATION/TRAINING PROGRAM
Payer: OTHER GOVERNMENT

## 2021-07-27 ENCOUNTER — HOSPITAL ENCOUNTER (OUTPATIENT)
Dept: ULTRASOUND IMAGING | Facility: CLINIC | Age: 31
End: 2021-07-27
Attending: STUDENT IN AN ORGANIZED HEALTH CARE EDUCATION/TRAINING PROGRAM
Payer: OTHER GOVERNMENT

## 2021-07-27 DIAGNOSIS — O43.122 VELAMENTOUS INSERTION OF UMBILICAL CORD, ANTEPARTUM, SECOND TRIMESTER: Primary | ICD-10-CM

## 2021-07-27 DIAGNOSIS — G93.0 CHOROID PLEXUS CYST: ICD-10-CM

## 2021-07-27 NOTE — PROGRESS NOTES
Type of service: Telemedicine Office Visit for prenatal ultrasound    Date of service:  Date: 07/27/21     Time service began:  12:00 PM    Time service ended:  1:00 PM    Reason: .tel: Lack of available service in patient area    Description of basis or telemedicine appropriateness:     Consultation provided at the request of Dr. Funez for advice regarding the diagnosis and treatment of this patient's pregnancy.  The patient's condition can be safely assessed via telemedicine.    The Mode of Transmission:    Secure interactive audio and visual telecommunication system (Video Guidance)    Location of originating and distant sites:      Originating site:  [place], [place]    Distant site:   [place], [place]    Jeferson Vazquez MD

## 2021-07-29 DIAGNOSIS — O43.122 VELAMENTOUS INSERTION OF UMBILICAL CORD IN SECOND TRIMESTER: Primary | ICD-10-CM

## 2021-08-16 ENCOUNTER — OFFICE VISIT (OUTPATIENT)
Dept: FAMILY MEDICINE | Facility: OTHER | Age: 31
End: 2021-08-16
Attending: NURSE PRACTITIONER
Payer: OTHER GOVERNMENT

## 2021-08-16 ENCOUNTER — NURSE TRIAGE (OUTPATIENT)
Dept: FAMILY MEDICINE | Facility: OTHER | Age: 31
End: 2021-08-16

## 2021-08-16 VITALS
WEIGHT: 166 LBS | RESPIRATION RATE: 18 BRPM | OXYGEN SATURATION: 98 % | HEART RATE: 98 BPM | TEMPERATURE: 98.5 F | SYSTOLIC BLOOD PRESSURE: 110 MMHG | DIASTOLIC BLOOD PRESSURE: 60 MMHG | BODY MASS INDEX: 28.49 KG/M2

## 2021-08-16 DIAGNOSIS — J02.0 STREPTOCOCCAL SORE THROAT: Primary | ICD-10-CM

## 2021-08-16 LAB — GROUP A STREP BY PCR: NOT DETECTED

## 2021-08-16 PROCEDURE — 87651 STREP A DNA AMP PROBE: CPT | Performed by: NURSE PRACTITIONER

## 2021-08-16 PROCEDURE — 99212 OFFICE O/P EST SF 10 MIN: CPT | Performed by: NURSE PRACTITIONER

## 2021-08-16 ASSESSMENT — PAIN SCALES - GENERAL: PAINLEVEL: NO PAIN (0)

## 2021-08-16 NOTE — NURSING NOTE
"Chief Complaint   Patient presents with     Pharyngitis     3 days        Initial /60 (BP Location: Right arm, Patient Position: Chair, Cuff Size: Adult Regular)   Pulse 98   Temp 98.5  F (36.9  C) (Tympanic)   Resp 18   Wt 75.3 kg (166 lb)   LMP 02/14/2021   SpO2 98%   BMI 28.49 kg/m   Estimated body mass index is 28.49 kg/m  as calculated from the following:    Height as of 4/6/21: 1.626 m (5' 4\").    Weight as of this encounter: 75.3 kg (166 lb).  Medication Reconciliation: complete  Jaja Espinosa LPN  "

## 2021-08-16 NOTE — PROGRESS NOTES
"    Assessment & Plan   1. Streptococcal sore throat  Differential is broad and includes strep throat, COVID19/viral, or allergy component. Lianne declines COVID swab today citing her low risk being mostly at home. Strep came back negative after visit. Recommended salt water gargle and return if not resolving in 2-3 days.   - Group A Streptococcus PCR Throat Swab ([place] ONLY)               BMI:   Estimated body mass index is 28.49 kg/m  as calculated from the following:    Height as of 21: 1.626 m (5' 4\").    Weight as of this encounter: 75.3 kg (166 lb).   N/A- Pregnant.        No follow-ups on file.    Olga Kidd, [facility] [place]    Subjective   Lianne is a 31 year old who presents for the following health issues     HPI     Acute Illness  Acute illness concerns: Mild sore throat more dry   Onset/Duration: x3 days  Symptoms:  Fever: no  Chills/Sweats: no  Headache (location?): no  Sinus Pressure: no  Conjunctivitis:  no  Ear Pain: no  Rhinorrhea: no  Congestion: no  Sore Throat: YES  Cough: no  Wheeze: no  Decreased Appetite: no  Nausea: no  Vomiting: no  Diarrhea: no  Dysuria/Freq.: no  Dysuria or Hematuria: no  Fatigue/Achiness: no  Rash: no  Sick/Strep Exposure: no. Patient and SO have been isolated at home without any known exposures to others over the past month. Neither work outside the home.   Therapies tried and outcome: None      Currently 26 weeks pregnant.       Review of Systems   Constitutional, HEENT, cardiovascular, pulmonary, gi and gu systems are negative, except as otherwise noted.      Objective    /60 (BP Location: Right arm, Patient Position: Chair, Cuff Size: Adult Regular)   Pulse 98   Temp 98.5  F (36.9  C) (Tympanic)   Resp 18   Wt 75.3 kg (166 lb)   LMP 2021   SpO2 98%   BMI 28.49 kg/m    Body mass index is 28.49 kg/m .  Physical Exam   GENERAL: healthy, alert and no distress  EYES: Eyes grossly normal to inspection, PERRL and " conjunctivae and sclerae normal  HENT: normal cephalic/atraumatic, oral mucous membranes moist. No tonsillar hypertrophy. No exudate. There is erythema to posterior pharynx.   NECK: no adenopathy, no asymmetry, masses, or scars and thyroid normal to palpation  RESP: lungs clear to auscultation - no rales, rhonchi or wheezes  PSYCH: mentation appears normal, affect normal/bright      Results for orders placed or performed in visit on 08/16/21   Group A Streptococcus PCR Throat Swab ([place] ONLY)     Status: Normal    Specimen: Throat; Swab   Result Value Ref Range    Group A strep by PCR Not Detected Not Detected    Narrative    The Xpert Xpress Strep A test, performed on the [employer]ument [employer], is a rapid, qualitative in vitro diagnostic test for the detection of Streptococcus pyogenes (Group A ß-hemolytic Streptococcus, Strep A) in throat swab specimens from patients with signs and symptoms of pharyngitis. The Xpert Xpress Strep A test can be used as an aid in the diagnosis of Group A Streptococcal pharyngitis. The assay is not intended to monitor treatment for Group A Streptococcus infections. The Xpert Xpress Strep A test utilizes an automated real-time polymerase chain reaction (PCR) to detect Streptococcus pyogenes DNA.

## 2021-08-16 NOTE — TELEPHONE ENCOUNTER
Sore throat with redness in back of throat. Symptoms starting x 3 days ago. See protocol for details.     appt scheduled:    Next 5 appointments (look out 90 days)    Aug 16, 2021 11:00 AM  (Arrive by 10:45 AM)  SHORT with Olga Kidd CNP  [facility] [place] ([facility] [place] ) [address]  [place] [address]  [phone]   Aug 18, 2021  8:30 AM  ESTABLISHED PRENATAL with Ellie Funez MD  [facility] ([facility] ) [address]  [place] [address]  [phone]   Sep 01, 2021  1:30 PM  ESTABLISHED PRENATAL with Ellie Funez MD  [facility] ([facility] ) [address]  [place] [address]  [phone]          Reason for Disposition    Patient wants to be seen    Additional Information    Negative: Severe difficulty breathing (e.g., struggling for each breath, speaks in single words)    Negative: Sounds like a life-threatening emergency to the triager    Negative: Throat culture results, call about    Negative: Productive cough is the main symptom    Negative: Runny nose is the main symptom    Negative: Drooling or spitting out saliva (because can't swallow)    Negative: Unable to open mouth completely    Negative: Drinking very little and has signs of dehydration (e.g., no urine > 12 hours, very dry mouth, very lightheaded)    Negative: Patient sounds very sick or weak to the triager    Negative: Difficulty breathing (per caller) but not severe    Negative: Fever > 103 F (39.4 C)    Negative: Refuses to drink anything for > 12 hours    Negative: History of rheumatic fever    Negative: Diabetes mellitus or weak immune system (e.g., HIV positive, cancer chemo, splenectomy, organ transplant, chronic steroids)    Negative: Widespread rash (especially chest and abdomen)    Negative: Earache also present    Negative: Pus on tonsils  "(back of throat) and swollen neck lymph nodes ('glands')    Negative: SEVERE sore throat pain    Answer Assessment - Initial Assessment Questions  1. ONSET: \"When did the throat start hurting?\" (Hours or days ago)       X 3 days ago    2. SEVERITY: \"How bad is the sore throat?\" (Scale 1-10; mild, moderate or severe)    - MILD (1-3):  doesn't interfere with eating or normal activities    - MODERATE (4-7): interferes with eating some solids and normal activities    - SEVERE (8-10):  excruciating pain, interferes with most normal activities    - SEVERE DYSPHAGIA: can't swallow liquids, drooling      Mild     3. STREP EXPOSURE: \"Has there been any exposure to strep within the past week?\" If so, ask: \"What type of contact occurred?\"       No known exposure    4.  VIRAL SYMPTOMS: \"Are there any symptoms of a cold, such as a runny nose, cough, hoarse voice or red eyes?\"       No     5. FEVER: \"Do you have a fever?\" If so, ask: \"What is your temperature, how was it measured, and when did it start?\"      No     6. PUS ON THE TONSILS: \"Is there pus on the tonsils in the back of your throat?\"      No     7. OTHER SYMPTOMS: \"Do you have any other symptoms?\" (e.g., difficulty breathing, headache, rash)      No     8. PREGNANCY: \"Is there any chance you are pregnant?\" \"When was your last menstrual period?\"      Yes, 26 weeks pregnany    Protocols used: SORE THROAT-A-OH      "

## 2021-08-16 NOTE — PATIENT INSTRUCTIONS
Recommend testing to rule out COVID19. Declined today.  Testing to rule out strep throat. We will call you and treat if positive. I see you are allergic to penicillins so we will use an alternative if needed.     Salt water gargle.   Patient Education     Self-Care for Sore Throats     Sore throats happen for many reasons, such as colds, allergies, cigarette smoke, air pollution, and infections caused by viruses or bacteria. In any case, your throat becomes red and sore. Your goal for self-care is to reduce your discomfort while giving your throat a chance to heal.  Moisten and soothe your throat  Tips include the following:    Try a sip of water first thing after waking up.    Keep your throat moist by drinking 6 or more glasses of clear liquids every day.    Run a cool-air humidifier in your room overnight.    Stay away from cigarette smoke.     Check the air quality index,if air pollution gives you a sore throat. On high pollution days, try to limit outdoor time.    Suck on throat lozenges, cough drops, hard candy, ice chips, or frozen fruit-juice bars. Use the sugar-free versions if your diet or medical condition requires them.  Gargle to ease irritation  Gargling every hour or 2 can ease irritation. Try gargling with 1 of these solutions:    1/4 teaspoon of salt in 1/2 cup of warm water    An over-the-counter anesthetic gargle  Use medicine for more relief  Over-the-counter medicine can reduce sore throat symptoms. Ask your pharmacist if you have questions about which medicine to use. To prevent possible medicine interactions, let the pharmacist know what medicines you take. To decrease symptoms:    Ease pain with anesthetic sprays. Aspirin or an aspirin substitute also helps. Remember, never give aspirin to anyone 18 or younger. Don't take aspirin if you are already taking blood thinners.     For sore throats caused by allergies, try antihistamines to block the allergic reaction.  Unless a sore throat is  caused by a bacterial infection, antibiotics won t help you.  Prevent future sore throats  Prevention tips include:    Stop smoking or reduce contact with secondhand smoke. Smoke irritates the tender throat lining.    Limit contact with pets and with allergy-causing substances, such as pollen and mold.    Wash your hands often when you re around someone with a sore throat or cold. This will keep viruses or bacteria from spreading.    Limit outdoor time when air pollution is bad.    Don t strain your vocal cords.  When to call your healthcare provider  Contact your healthcare provider if you have:    Fever of 100.4 F (38.0 C) or higher, or as directed by your healthcare provider    White spots on the throat    Great Trouble swallowing    A skin rash    Recent exposure to someone else with strep bacteria    Severe hoarseness and swollen glands in the neck or jaw  Call 911  Call 911 if any of the following occur:    Trouble breathing or catching your breath    Drooling and problems swallowing    Wheezing    Unable to talk    Feeling dizzy or faint    Feeling of doom  [employer] last reviewed this educational content on 9/1/2019 2000-2021 [employer]. All rights reserved. This information is not intended as a substitute for professional medical care. Always follow your healthcare professional's instructions.

## 2021-08-18 ENCOUNTER — PRENATAL OFFICE VISIT (OUTPATIENT)
Dept: OBGYN | Facility: OTHER | Age: 31
End: 2021-08-18
Attending: STUDENT IN AN ORGANIZED HEALTH CARE EDUCATION/TRAINING PROGRAM
Payer: OTHER GOVERNMENT

## 2021-08-18 VITALS
WEIGHT: 164.7 LBS | SYSTOLIC BLOOD PRESSURE: 102 MMHG | HEART RATE: 72 BPM | DIASTOLIC BLOOD PRESSURE: 66 MMHG | BODY MASS INDEX: 28.27 KG/M2

## 2021-08-18 DIAGNOSIS — O43.122 VELAMENTOUS INSERTION OF UMBILICAL CORD IN SECOND TRIMESTER: Primary | ICD-10-CM

## 2021-08-18 DIAGNOSIS — Z98.891 HISTORY OF CESAREAN DELIVERY: ICD-10-CM

## 2021-08-18 LAB
BASOPHILS # BLD AUTO: 0 10E3/UL (ref 0–0.2)
BASOPHILS NFR BLD AUTO: 0 %
EOSINOPHIL # BLD AUTO: 0.2 10E3/UL (ref 0–0.7)
EOSINOPHIL NFR BLD AUTO: 1 %
ERYTHROCYTE [DISTWIDTH] IN BLOOD BY AUTOMATED COUNT: 13.1 % (ref 10–15)
GLUCOSE 1H P 50 G GLC PO SERPL-MCNC: 118 MG/DL (ref 70–129)
HCT VFR BLD AUTO: 34.9 % (ref 35–47)
HGB BLD-MCNC: 12.1 G/DL (ref 11.7–15.7)
IMM GRANULOCYTES # BLD: 0.2 10E3/UL
IMM GRANULOCYTES NFR BLD: 2 %
LYMPHOCYTES # BLD AUTO: 1.1 10E3/UL (ref 0.8–5.3)
LYMPHOCYTES NFR BLD AUTO: 10 %
MCH RBC QN AUTO: 31.1 PG (ref 26.5–33)
MCHC RBC AUTO-ENTMCNC: 34.7 G/DL (ref 31.5–36.5)
MCV RBC AUTO: 90 FL (ref 78–100)
MONOCYTES # BLD AUTO: 0.6 10E3/UL (ref 0–1.3)
MONOCYTES NFR BLD AUTO: 5 %
NEUTROPHILS # BLD AUTO: 9 10E3/UL (ref 1.6–8.3)
NEUTROPHILS NFR BLD AUTO: 82 %
NRBC # BLD AUTO: 0 10E3/UL
NRBC BLD AUTO-RTO: 0 /100
PLATELET # BLD AUTO: 242 10E3/UL (ref 150–450)
RBC # BLD AUTO: 3.89 10E6/UL (ref 3.8–5.2)
WBC # BLD AUTO: 11.1 10E3/UL (ref 4–11)

## 2021-08-18 PROCEDURE — 36415 COLL VENOUS BLD VENIPUNCTURE: CPT | Mod: ZL | Performed by: STUDENT IN AN ORGANIZED HEALTH CARE EDUCATION/TRAINING PROGRAM

## 2021-08-18 PROCEDURE — 85025 COMPLETE CBC W/AUTO DIFF WBC: CPT | Mod: ZL | Performed by: STUDENT IN AN ORGANIZED HEALTH CARE EDUCATION/TRAINING PROGRAM

## 2021-08-18 PROCEDURE — 86780 TREPONEMA PALLIDUM: CPT | Mod: ZL | Performed by: STUDENT IN AN ORGANIZED HEALTH CARE EDUCATION/TRAINING PROGRAM

## 2021-08-18 PROCEDURE — 99207 PR OB VISIT-NO CHARGE - GICH ONLY: CPT | Performed by: STUDENT IN AN ORGANIZED HEALTH CARE EDUCATION/TRAINING PROGRAM

## 2021-08-18 PROCEDURE — 82952 GTT-ADDED SAMPLES: CPT | Mod: ZL | Performed by: STUDENT IN AN ORGANIZED HEALTH CARE EDUCATION/TRAINING PROGRAM

## 2021-08-18 ASSESSMENT — PAIN SCALES - GENERAL: PAINLEVEL: NO PAIN (0)

## 2021-08-18 NOTE — NURSING NOTE
Pt presents to clinic today for prenatal care 26w3d. Pt denies any contractions, bleeding, or leakage of fluid at this time. [facility] bay is moving a lot.        Medication Reconciliation: complete  Erika Martinez LPN

## 2021-08-18 NOTE — PROGRESS NOTES
Return OB Visit    S: Ms. Martinez is feeling well today. She has no acute concerns. Denies leaking of fluid, vaginal bleeding, painful contractions. Notes fetal movements. She did her glucola today.     O:   /66   Pulse 72   Wt 74.7 kg (164 lb 11.2 oz)   LMP 2021   Breastfeeding No   BMI 28.27 kg/m      Gen: Well-appearing, NAD  See OB Flowsheet    FH 26 cm   bpm    Assessment:  Ms. Lianne Martinez is a 30 year old yo  here today for an OB follow up. She is currently 26w3d. Her pregnancy is complicated by history of prior  section.     Plan:  # Routine Prenatal Care  -- Dating: LMP=7 week US OSCAR: 2021  -- PNLs:               B NEG, AB screen neg              RPR nr              Hep B S Ag neg              Rubella Immune              HIV neg in 2021                         Pap: NIL, HPV negative              GC/Chlam neg     -- Genetic Screening: Declined  -- Anatomy US: : Questionable left choroid cyst, follow up MFM scan ordered- resolved/not present  -- Immunizations: Tdap at approximately 27 weeks gestation  -- 3rd TM labs including CBC, RPR: Today  -- 1 hr GTT: Today   -- GBS: Planned for 36 weeks  -- Postpartum Planning: To be discussed   -- Delivery Planning: Plan for RCS at 39 weeks  -- Return to clinic in 4 weeks for OB follow up visit  -- Planning to do at next visit: Follow up growth US     # Previous CS due to breech  -- Plans for repeat at 39 weeks     # B NEG blood type  -- Will need rhogam at 28 weeks gestation -- she will return at 28 weeks for Rhogam/Nurse only visit  -- s/p rhogam on  due to vaginal spotting    # Velamentous cord insertion  -- Growth scans q 6 weeks (first scheduled for )

## 2021-08-19 LAB — T PALLIDUM AB SER QL: NONREACTIVE

## 2021-08-26 ENCOUNTER — IMMUNIZATION (OUTPATIENT)
Dept: FAMILY MEDICINE | Facility: OTHER | Age: 31
End: 2021-08-26
Payer: OTHER GOVERNMENT

## 2021-08-26 PROCEDURE — 0001A PR COVID VAC PFIZER DIL RECON 30 MCG/0.3 ML IM: CPT

## 2021-08-26 PROCEDURE — 91300 PR COVID VAC PFIZER DIL RECON 30 MCG/0.3 ML IM: CPT

## 2021-09-01 ENCOUNTER — HOSPITAL ENCOUNTER (OUTPATIENT)
Dept: ULTRASOUND IMAGING | Facility: OTHER | Age: 31
End: 2021-09-01
Attending: STUDENT IN AN ORGANIZED HEALTH CARE EDUCATION/TRAINING PROGRAM
Payer: OTHER GOVERNMENT

## 2021-09-01 ENCOUNTER — PRENATAL OFFICE VISIT (OUTPATIENT)
Dept: OBGYN | Facility: OTHER | Age: 31
End: 2021-09-01
Attending: STUDENT IN AN ORGANIZED HEALTH CARE EDUCATION/TRAINING PROGRAM
Payer: OTHER GOVERNMENT

## 2021-09-01 VITALS
BODY MASS INDEX: 28.87 KG/M2 | SYSTOLIC BLOOD PRESSURE: 124 MMHG | WEIGHT: 168.2 LBS | HEART RATE: 98 BPM | DIASTOLIC BLOOD PRESSURE: 82 MMHG

## 2021-09-01 DIAGNOSIS — O43.122 VELAMENTOUS INSERTION OF UMBILICAL CORD IN SECOND TRIMESTER: ICD-10-CM

## 2021-09-01 DIAGNOSIS — Z98.891 HISTORY OF CESAREAN DELIVERY: ICD-10-CM

## 2021-09-01 DIAGNOSIS — O43.122 VELAMENTOUS INSERTION OF UMBILICAL CORD IN SECOND TRIMESTER: Primary | ICD-10-CM

## 2021-09-01 PROCEDURE — 99207 PR OB VISIT-NO CHARGE - GICH ONLY: CPT | Performed by: STUDENT IN AN ORGANIZED HEALTH CARE EDUCATION/TRAINING PROGRAM

## 2021-09-01 PROCEDURE — 250N000011 HC RX IP 250 OP 636: Performed by: STUDENT IN AN ORGANIZED HEALTH CARE EDUCATION/TRAINING PROGRAM

## 2021-09-01 PROCEDURE — 90471 IMMUNIZATION ADMIN: CPT | Performed by: STUDENT IN AN ORGANIZED HEALTH CARE EDUCATION/TRAINING PROGRAM

## 2021-09-01 PROCEDURE — 90715 TDAP VACCINE 7 YRS/> IM: CPT | Performed by: STUDENT IN AN ORGANIZED HEALTH CARE EDUCATION/TRAINING PROGRAM

## 2021-09-01 PROCEDURE — 96372 THER/PROPH/DIAG INJ SC/IM: CPT | Performed by: STUDENT IN AN ORGANIZED HEALTH CARE EDUCATION/TRAINING PROGRAM

## 2021-09-01 PROCEDURE — 76816 OB US FOLLOW-UP PER FETUS: CPT

## 2021-09-01 RX ADMIN — HUMAN RHO(D) IMMUNE GLOBULIN 300 MCG: 1500 SOLUTION INTRAMUSCULAR; INTRAVENOUS at 14:17

## 2021-09-01 ASSESSMENT — PAIN SCALES - GENERAL: PAINLEVEL: NO PAIN (0)

## 2021-09-01 NOTE — NURSING NOTE
Pt presents to clinic today for prenatal care 28w3d. Pt denies any contractions, bleeding, or leakage of fluid at this time. States baby is moving good.      Medication Reconciliation: complete  Erika Martinez LPN

## 2021-09-01 NOTE — PROGRESS NOTES
Return OB Visit    S: Lia is tearful as she enters the office visit because she just came from her follow up growth US that demonstrated a fetus with overall EFW 16%ile (this is down from 24%ile a month ago). We discussed in detail the variation in growth US measurements and the definition of IUGR. We reviewed that AC and amniotic fluid is usually the first indications of fetal stress- both of which were normal in her scan today (AC 24%ile and MVP 4.3 cm)    We have been closely watching fetal growth in the setting of velamentous cord insertion on placenta.      O: /82   Pulse 98   Wt 76.3 kg (168 lb 3.2 oz)   LMP 2021   BMI 28.87 kg/m    Gen: Well-appearing, NAD    Growth US: 16%ile overall (AC 24%ile, BPD 58%ile, HC 31%ile, FL 9%ile  FHT: 142 bpm  MVP: 4.3 cm    Assessment:  Ms. Lianne Martinez is a 30 year old yo  here today for an OB follow up. She is currently 28w3d. Her pregnancy is complicated by history of prior  section, B neg blood type, and velamentous cord insertion.     Plan:  # Routine Prenatal Care  -- Dating: LMP=7 week US OSCAR: 2021  -- PNLs:               B NEG, AB screen neg              RPR nr              Hep B S Ag neg              Rubella Immune              HIV neg in 2021                         Pap: NIL, HPV negative              GC/Chlam neg     -- Genetic Screening: Declined  -- Anatomy US: : Questionable left choroid cyst, follow up MFM scan ordered- resolved/not present  -- Immunizations: Tdap today, Rhogam today  -- 3rd TM labs including CBC, RPR: Hgb 12.1, RPR nr  -- 1 hr GTT: 118  -- GBS: Planned for 36 weeks  -- Postpartum Planning: To be discussed   -- Delivery Planning: Plan for RCS at 39 weeks  -- Return to clinic in 4 weeks for OB follow up visit  -- Planning to do at next visit: Follow up growth US     # Previous CS due to breech  -- Plans for repeat at 39 weeks     # B NEG blood type  -- Rhogam given   -- s/p rhogam on  due  to vaginal spotting     # Velamentous cord insertion  -- Growth scans q 3-4 weeks   9/1 16%Ile: AC 24%ile, BPD 58%ile, HC 31%ile, FL 9%ile   Next ordered for 9/21

## 2021-09-10 ENCOUNTER — HOSPITAL ENCOUNTER (OUTPATIENT)
Facility: OTHER | Age: 31
End: 2021-09-10
Admitting: OBSTETRICS & GYNECOLOGY
Payer: OTHER GOVERNMENT

## 2021-09-10 ENCOUNTER — HOSPITAL ENCOUNTER (OUTPATIENT)
Facility: OTHER | Age: 31
Discharge: HOME | End: 2021-09-10
Attending: OBSTETRICS & GYNECOLOGY | Admitting: OBSTETRICS & GYNECOLOGY
Payer: OTHER GOVERNMENT

## 2021-09-10 VITALS — RESPIRATION RATE: 18 BRPM | SYSTOLIC BLOOD PRESSURE: 113 MMHG | DIASTOLIC BLOOD PRESSURE: 65 MMHG | HEART RATE: 87 BPM

## 2021-09-10 PROBLEM — Z36.89 ENCOUNTER FOR TRIAGE IN PREGNANT PATIENT: Status: ACTIVE | Noted: 2021-09-10

## 2021-09-10 RX ORDER — LIDOCAINE 40 MG/G
CREAM TOPICAL
Status: DISCONTINUED | OUTPATIENT
Start: 2021-09-10 | End: 2021-09-10 | Stop reason: HOSPADM

## 2021-09-10 NOTE — PROGRESS NOTES
Admitted to room 412 with concerns of decreased fetal movement, FHM applied at 1822 and FHTs initally 170s, moderate variability. Patient tearful and relieved to hear fetal heart beat. Patient states she is high risk pregnancy due to velamentous umbilical cord insertion and has been doctoring with JUSTINA Funez MD and  at [facility] and having OB US every three weeks. Please see flow sheet for additional information.

## 2021-09-11 NOTE — PROGRESS NOTES
NSG DISCHARGE NOTE    Patient discharged to home at 7:50 PM via ambulation. Discharge instructions reviewed with patient, opportunity offered to ask questions. Prescriptions - None ordered for discharge. All belongings sent with patient.    Sindy Kimble RN

## 2021-09-11 NOTE — DISCHARGE INSTRUCTIONS
Return to [facility] with concerns such as decreased fetal movement, leaking of fluids, vaginal bleeding or contractions every 3-5 minutes lasting 60-90 seconds. Call with questions or concerns [phone].    Kick counts: 10 movements  In 2 hours counted 1 time daily.

## 2021-09-11 NOTE — PROGRESS NOTES
Phone call to ED GONZALES MD and informed of patients admission, FHM strip of FHT baseline 140, moderate variability, accelerations and no decelerations. Also informed of patients history of velamentous umbilical cord insertion and being seen by Dee Dee JONES and at [facility] for high risk pregnancy. Also informed patient had some very mild cramping that are not uncomfortable for patient. Orders received patient could be discharged home.

## 2021-09-14 ENCOUNTER — PRENATAL OFFICE VISIT (OUTPATIENT)
Dept: OBGYN | Facility: OTHER | Age: 31
End: 2021-09-14
Attending: OBSTETRICS & GYNECOLOGY
Payer: OTHER GOVERNMENT

## 2021-09-14 VITALS
SYSTOLIC BLOOD PRESSURE: 112 MMHG | DIASTOLIC BLOOD PRESSURE: 70 MMHG | WEIGHT: 171.1 LBS | HEART RATE: 90 BPM | RESPIRATION RATE: 18 BRPM | BODY MASS INDEX: 29.37 KG/M2

## 2021-09-14 DIAGNOSIS — Z34.90 NORMAL PREGNANCY, ANTEPARTUM: Primary | ICD-10-CM

## 2021-09-14 DIAGNOSIS — O43.123 VELAMENTOUS INSERTION OF UMBILICAL CORD IN THIRD TRIMESTER: ICD-10-CM

## 2021-09-14 PROCEDURE — 99207 PR OB VISIT-NO CHARGE - GICH ONLY: CPT | Performed by: OBSTETRICS & GYNECOLOGY

## 2021-09-14 ASSESSMENT — PAIN SCALES - GENERAL: PAINLEVEL: NO PAIN (0)

## 2021-09-14 NOTE — PROGRESS NOTES
CC: Recheck OB visit at 30w2d    HPI: Lianne Martinez presents for a routine OB visit now at 30w2d  Concerns: Patient concerned about baby's growth as it relates to head size and velamentous insertion.    Patient notices normal fetal movement, denies contractions, vaginal bleeding or leaking of fluid.    OB History    Para Term  AB Living   2 1 1 0 0 1   SAB TAB Ectopic Multiple Live Births   0 0 0 0 1      # Outcome Date GA Lbr Sukhi/2nd Weight Sex Delivery Anes PTL Lv   2 Current            1 Term 10/03/15    M CS-Unspec   COURTNEY      Name: Panda     Current Outpatient Medications   Medication     Multiple Vitamins-Minerals (MULTIVITAMIN ADULT PO)     Current Facility-Administered Medications   Medication     rho(D) immune globulin (RHOGAM) injection 300 mcg         O: /70 (BP Location: Right arm, Patient Position: Sitting, Cuff Size: Adult Regular)   Pulse 90   Resp 18   Wt 77.6 kg (171 lb 1.6 oz)   LMP 2021   BMI 29.37 kg/m    Body mass index is 29.37 kg/m .  See OB flow sheet  EXAM:  NAD  FH:31 cm  FHT: 140 bpm    No results found for any visits on 21.    A/P: 30w2d gestation    Recheck in 1 week with Dr. Williamson as scheduled  Reassured her that baby is not growth restricted by definition, error of measurement in third trimester of 15%. Plan for fetal monitoring, cord dopplers,  testing, and earlier delivery if growth trend is down and EFW under 5th%ile depending on cord dopplers. Noted FL as the lagging measurement which is likely affecting the EFW on the last US, and this may be due to technical error vs short femur. Reviewed normal FL at 20 week and MFM scan which makes this less likely a chromosomal issue.      Problem List:   Velamentous cord insertion  Planning repeat     Warren Escobar MD FACOG  10:28 AM 2021

## 2021-09-14 NOTE — NURSING NOTE
"Chief Complaint   Patient presents with     Prenatal Care     30w2d     Patient presents to clinic for prenatal care, no concerns noted today.    Initial /70 (BP Location: Right arm, Patient Position: Sitting, Cuff Size: Adult Regular)   Pulse 90   Resp 18   Wt 77.6 kg (171 lb 1.6 oz)   LMP 02/14/2021   BMI 29.37 kg/m   Estimated body mass index is 29.37 kg/m  as calculated from the following:    Height as of 4/6/21: 1.626 m (5' 4\").    Weight as of this encounter: 77.6 kg (171 lb 1.6 oz).  Medication Reconciliation: complete    ADRIENNE CHAN, LPN  "

## 2021-09-16 ENCOUNTER — IMMUNIZATION (OUTPATIENT)
Dept: FAMILY MEDICINE | Facility: OTHER | Age: 31
End: 2021-09-16
Attending: FAMILY MEDICINE
Payer: OTHER GOVERNMENT

## 2021-09-16 PROCEDURE — 0002A PR COVID VAC PFIZER DIL RECON 30 MCG/0.3 ML IM: CPT

## 2021-09-16 PROCEDURE — 91300 PR COVID VAC PFIZER DIL RECON 30 MCG/0.3 ML IM: CPT

## 2021-09-20 NOTE — PROGRESS NOTES
Return OB Visit    S: Ms. Martinez is feeling well today. She has no acute concerns. Denies leaking of fluid, vaginal bleeding, painful contractions. Notes fetal movements.    O:   /65 (BP Location: Right arm, Patient Position: Sitting, Cuff Size: Adult Regular)   Pulse 84   Wt 77.6 kg (171 lb)   LMP 2021   Breastfeeding No   BMI 29.35 kg/m      Gen: Well-appearing, NAD    Growth US today: 23%ile, AC 31%ile      Assessment:  Ms. Lianne Martinez is a 30 year old yo  here today for an OB follow up. She is currently 31w2d. Her pregnancy is complicated by history of prior  section, B neg blood type, and velamentous cord insertion.     Plan:  # Routine Prenatal Care  -- Dating: LMP=7 week US OSCAR: 2021  -- PNLs:               B NEG, AB screen neg              RPR nr              Hep B S Ag neg              Rubella Immune              HIV neg in 2021                         Pap: NIL, HPV negative              GC/Chlam neg     -- Genetic Screening: Declined  -- Anatomy US: : Questionable left choroid cyst, follow up MFM scan ordered- resolved/not present  -- Immunizations: Tdap , Rhogam   -- 3rd TM labs including CBC, RPR: Hgb 12.1, RPR nr  -- 1 hr GTT: 118  -- GBS: Planned for 36 weeks  -- Postpartum Planning: To be discussed   -- Delivery Planning: Plan for RCS at 39 weeks  -- Return to clinic in 2 weeks for OB follow up visit  -- Planning to do at next visit: Schedule RCS for 39 weeks     # Previous CS due to breech  -- Plans for repeat at 39 weeks     # B NEG blood type  -- Rhogam given   -- s/p rhogam on  due to vaginal spotting     # Velamentous cord insertion  -- Growth scans q 3-4 weeks                16%Ile: AC 24%ile, BPD 58%ile, HC 31%ile, FL 9%ile               : 23%ile: AC 31%ile, BPD 67%ile, HC 38%ile, FL 10%ile   10/11 ordered

## 2021-09-21 ENCOUNTER — PRENATAL OFFICE VISIT (OUTPATIENT)
Dept: OBGYN | Facility: OTHER | Age: 31
End: 2021-09-21
Attending: STUDENT IN AN ORGANIZED HEALTH CARE EDUCATION/TRAINING PROGRAM
Payer: OTHER GOVERNMENT

## 2021-09-21 ENCOUNTER — HOSPITAL ENCOUNTER (OUTPATIENT)
Dept: ULTRASOUND IMAGING | Facility: OTHER | Age: 31
End: 2021-09-21
Attending: STUDENT IN AN ORGANIZED HEALTH CARE EDUCATION/TRAINING PROGRAM
Payer: OTHER GOVERNMENT

## 2021-09-21 VITALS
SYSTOLIC BLOOD PRESSURE: 110 MMHG | HEART RATE: 84 BPM | DIASTOLIC BLOOD PRESSURE: 65 MMHG | WEIGHT: 171 LBS | BODY MASS INDEX: 29.35 KG/M2

## 2021-09-21 DIAGNOSIS — O43.122 VELAMENTOUS INSERTION OF UMBILICAL CORD IN SECOND TRIMESTER: ICD-10-CM

## 2021-09-21 DIAGNOSIS — O43.123 VELAMENTOUS INSERTION OF UMBILICAL CORD IN THIRD TRIMESTER: Primary | ICD-10-CM

## 2021-09-21 DIAGNOSIS — Z98.891 HISTORY OF CESAREAN DELIVERY: ICD-10-CM

## 2021-09-21 PROCEDURE — 99207 PR OB VISIT-NO CHARGE - GICH ONLY: CPT | Performed by: STUDENT IN AN ORGANIZED HEALTH CARE EDUCATION/TRAINING PROGRAM

## 2021-09-21 PROCEDURE — 76816 OB US FOLLOW-UP PER FETUS: CPT

## 2021-09-21 ASSESSMENT — PAIN SCALES - GENERAL: PAINLEVEL: NO PAIN (0)

## 2021-09-21 NOTE — NURSING NOTE
"  Chief Complaint   Patient presents with     Prenatal Care     31w2d       Initial /65 (BP Location: Right arm, Patient Position: Sitting, Cuff Size: Adult Regular)   Pulse 84   Wt 77.6 kg (171 lb)   LMP 02/14/2021   Breastfeeding No   BMI 29.35 kg/m   Estimated body mass index is 29.35 kg/m  as calculated from the following:    Height as of 4/6/21: 1.626 m (5' 4\").    Weight as of this encounter: 77.6 kg (171 lb).  Medication Reconciliation: complete    Bethany Diaz RN  "

## 2021-09-29 ENCOUNTER — PRENATAL OFFICE VISIT (OUTPATIENT)
Dept: OBGYN | Facility: OTHER | Age: 31
End: 2021-09-29
Attending: STUDENT IN AN ORGANIZED HEALTH CARE EDUCATION/TRAINING PROGRAM
Payer: OTHER GOVERNMENT

## 2021-09-29 VITALS
SYSTOLIC BLOOD PRESSURE: 110 MMHG | HEART RATE: 97 BPM | BODY MASS INDEX: 29.39 KG/M2 | RESPIRATION RATE: 18 BRPM | DIASTOLIC BLOOD PRESSURE: 64 MMHG | WEIGHT: 171.2 LBS

## 2021-09-29 DIAGNOSIS — Z98.891 HISTORY OF CESAREAN DELIVERY: ICD-10-CM

## 2021-09-29 DIAGNOSIS — O43.123 VELAMENTOUS INSERTION OF UMBILICAL CORD IN THIRD TRIMESTER: Primary | ICD-10-CM

## 2021-09-29 PROCEDURE — 99207 PR OB VISIT-NO CHARGE - GICH ONLY: CPT | Performed by: STUDENT IN AN ORGANIZED HEALTH CARE EDUCATION/TRAINING PROGRAM

## 2021-09-29 ASSESSMENT — PAIN SCALES - GENERAL: PAINLEVEL: NO PAIN (0)

## 2021-09-29 NOTE — PROGRESS NOTES
Return OB Visit    S: Ms. Martinez is feeling well today. She has no acute concerns. Denies leaking of fluid, vaginal bleeding, painful contractions. Notes fetal movements.     O:   /64 (BP Location: Right arm, Patient Position: Sitting, Cuff Size: Adult Regular)   Pulse 97   Resp 18   Wt 77.7 kg (171 lb 3.2 oz)   LMP 2021   BMI 29.39 kg/m      Gen: Well-appearing, NAD  See OB Flowsheet    FH 31 cm   bpm    Assessment:  Ms. Lianne Martinez is a 30 year old yo  here today for an OB follow up. She is currently 32w3d. Her pregnancy is complicated by history of prior  section, B neg blood type, and velamentous cord insertion.     Plan:  # Routine Prenatal Care  -- Dating: LMP=7 week US OSCAR: 2021  -- PNLs:               B NEG, AB screen neg              RPR nr              Hep B S Ag neg              Rubella Immune              HIV neg in 2021                         Pap: NIL, HPV negative              GC/Chlam neg     -- Genetic Screening: Declined  -- Anatomy US: : Questionable left choroid cyst, follow up MFM scan ordered- resolved/not present  -- Immunizations: Tdap , Rhogam   -- 3rd TM labs including CBC, RPR: Hgb 12.1, RPR nr  -- 1 hr GTT: 118  -- GBS: Planned for 36 weeks  -- Postpartum Planning: To be discussed   -- Delivery Planning: Plan for RCS at 39 weeks  -- Return to clinic in 2 weeks for OB follow up visit  -- Planning to do at next visit: Schedule RCS for 39 weeks     # Previous CS due to breech  -- Plans for repeat at 39 weeks     # B NEG blood type  -- Rhogam given   -- s/p rhogam on  due to vaginal spotting     # Velamentous cord insertion  -- Growth scans q 3-4 weeks                16%Ile: AC 24%ile, BPD 58%ile, HC 31%ile, FL 9%ile               : 23%ile: AC 31%ile, BPD 67%ile, HC 38%ile, FL 10%ile               10/13: ordered

## 2021-09-29 NOTE — NURSING NOTE
"Chief Complaint   Patient presents with     Prenatal Care     32w3d     Patient presents to clinic for prenatal care, had some cramping yesterday that lasted a while, otherwise no concerns.    Initial /64 (BP Location: Right arm, Patient Position: Sitting, Cuff Size: Adult Regular)   Pulse 97   Resp 18   Wt 77.7 kg (171 lb 3.2 oz)   LMP 02/14/2021   BMI 29.39 kg/m   Estimated body mass index is 29.39 kg/m  as calculated from the following:    Height as of 4/6/21: 1.626 m (5' 4\").    Weight as of this encounter: 77.7 kg (171 lb 3.2 oz).  Medication Reconciliation: complete    ADRIENNE CHAN, LPN  "

## 2021-10-10 ENCOUNTER — HEALTH MAINTENANCE LETTER (OUTPATIENT)
Age: 31
End: 2021-10-10

## 2021-10-13 ENCOUNTER — HOSPITAL ENCOUNTER (OUTPATIENT)
Dept: ULTRASOUND IMAGING | Facility: OTHER | Age: 31
End: 2021-10-13
Attending: STUDENT IN AN ORGANIZED HEALTH CARE EDUCATION/TRAINING PROGRAM
Payer: OTHER GOVERNMENT

## 2021-10-13 ENCOUNTER — PRENATAL OFFICE VISIT (OUTPATIENT)
Dept: OBGYN | Facility: OTHER | Age: 31
End: 2021-10-13
Attending: STUDENT IN AN ORGANIZED HEALTH CARE EDUCATION/TRAINING PROGRAM
Payer: OTHER GOVERNMENT

## 2021-10-13 VITALS
BODY MASS INDEX: 30.43 KG/M2 | SYSTOLIC BLOOD PRESSURE: 110 MMHG | HEART RATE: 88 BPM | WEIGHT: 177.3 LBS | DIASTOLIC BLOOD PRESSURE: 68 MMHG

## 2021-10-13 DIAGNOSIS — O43.123 VELAMENTOUS INSERTION OF UMBILICAL CORD IN THIRD TRIMESTER: Primary | ICD-10-CM

## 2021-10-13 DIAGNOSIS — Z01.812 PRE-PROCEDURE LAB EXAM: ICD-10-CM

## 2021-10-13 DIAGNOSIS — Z98.891 HISTORY OF CESAREAN DELIVERY: ICD-10-CM

## 2021-10-13 DIAGNOSIS — Z98.891 HISTORY OF CESAREAN SECTION: ICD-10-CM

## 2021-10-13 DIAGNOSIS — O43.123 VELAMENTOUS INSERTION OF UMBILICAL CORD IN THIRD TRIMESTER: ICD-10-CM

## 2021-10-13 PROCEDURE — 99207 PR OB VISIT-NO CHARGE - GICH ONLY: CPT | Performed by: OBSTETRICS & GYNECOLOGY

## 2021-10-13 PROCEDURE — 76816 OB US FOLLOW-UP PER FETUS: CPT

## 2021-10-13 RX ORDER — OXYTOCIN/0.9 % SODIUM CHLORIDE 30/500 ML
100-340 PLASTIC BAG, INJECTION (ML) INTRAVENOUS CONTINUOUS PRN
Status: CANCELLED | OUTPATIENT
Start: 2021-10-13

## 2021-10-13 RX ORDER — CARBOPROST TROMETHAMINE 250 UG/ML
250 INJECTION, SOLUTION INTRAMUSCULAR
Status: CANCELLED | OUTPATIENT
Start: 2021-10-13

## 2021-10-13 RX ORDER — MISOPROSTOL 100 UG/1
400 TABLET ORAL
Status: CANCELLED | OUTPATIENT
Start: 2021-10-13

## 2021-10-13 RX ORDER — OXYTOCIN/0.9 % SODIUM CHLORIDE 30/500 ML
340 PLASTIC BAG, INJECTION (ML) INTRAVENOUS CONTINUOUS PRN
Status: CANCELLED | OUTPATIENT
Start: 2021-10-13

## 2021-10-13 RX ORDER — OXYTOCIN 10 [USP'U]/ML
10 INJECTION, SOLUTION INTRAMUSCULAR; INTRAVENOUS
Status: CANCELLED | OUTPATIENT
Start: 2021-10-13

## 2021-10-13 RX ORDER — ACETAMINOPHEN 325 MG/1
975 TABLET ORAL ONCE
Status: CANCELLED | OUTPATIENT
Start: 2021-10-13 | End: 2021-10-13

## 2021-10-13 RX ORDER — TRANEXAMIC ACID 10 MG/ML
1 INJECTION, SOLUTION INTRAVENOUS EVERY 30 MIN PRN
Status: CANCELLED | OUTPATIENT
Start: 2021-10-13

## 2021-10-13 RX ORDER — CLINDAMYCIN PHOSPHATE 900 MG/50ML
900 INJECTION, SOLUTION INTRAVENOUS
Status: CANCELLED | OUTPATIENT
Start: 2021-10-13

## 2021-10-13 RX ORDER — CITRIC ACID/SODIUM CITRATE 334-500MG
30 SOLUTION, ORAL ORAL
Status: CANCELLED | OUTPATIENT
Start: 2021-10-13

## 2021-10-13 RX ORDER — SODIUM CHLORIDE, SODIUM LACTATE, POTASSIUM CHLORIDE, CALCIUM CHLORIDE 600; 310; 30; 20 MG/100ML; MG/100ML; MG/100ML; MG/100ML
INJECTION, SOLUTION INTRAVENOUS CONTINUOUS
Status: CANCELLED | OUTPATIENT
Start: 2021-10-13

## 2021-10-13 RX ORDER — METHYLERGONOVINE MALEATE 0.2 MG/ML
200 INJECTION INTRAVENOUS
Status: CANCELLED | OUTPATIENT
Start: 2021-10-13

## 2021-10-13 RX ORDER — LIDOCAINE 40 MG/G
CREAM TOPICAL
Status: CANCELLED | OUTPATIENT
Start: 2021-10-13

## 2021-10-13 NOTE — NURSING NOTE
Pt presents to clinic today for prenatal care 34w3d. Pt denies any contractions, bleeding, or leakage of fluid at this time.    Medication Reconciliation: complete  Erika Martinez LPN

## 2021-10-13 NOTE — PROGRESS NOTES
Return OB Visit    S: Patient is doing well overall. Has noticed more hand and feet swelling. Also having tingling in her fingertips. Has been checking her BPs at home and they have been normal. Is using carpal tunnel braces, primarily at night.     O: /68   Pulse 88   Wt 80.4 kg (177 lb 4.8 oz)   LMP 2021   BMI 30.43 kg/m    Gen: Well-appearing, NAD  See OB Flowsheet    A/P:  Lianne Martinez is a 31 year old  at 34w3d by LMP c/w 7wk US, here for return OB visit.  Velamentous cord insertion: growth US 10/13/2021 2292g (29%ile)  Prior  section x1: repeat scheduled for 11/15/21  Declines sterilization. Reviewed contraception options  Plans breastfeeding    PNC: Rh negative s/p Rhogam, Rubella immune,   Genetics: declined  Imaging: dating US at 7w0d, anatomy survey with questionable CPC, resolved on MFM scan  Immunizations: s/p COVID series, Tdap  RTC 2 weeks with ALD, GBS next visit    Tarah Garrison MD  OB/GYN  10/13/2021 10:33 AM

## 2021-10-13 NOTE — NURSING NOTE
"Date of Surgery: 11/15/21  Type of Surgery: Repeat  section   Surgeon: Dr. Freida Williamson and Baby Doc: Dr. Odonnell     appropriate appointments were scheduled by the [facility]  Patient was given surgical folder  which includes pre-operative bathing instructions related to the two packets of Hibiclens surgical prep provided.. Surgical forms were copied and kept for informative purposes. Originals were delivered to Day-surgery. Questions were answered to the best of this nurse's ability.        STOP BANG    Fever/Chills or other infectious symptoms in past month? no  >10 pound weight loss in the past 2 months? no  Health Care Directive on file? no  History of blood transfusions? no  Td up to date? yes  History of VRE/MRSA? no      Obstructive Sleep Apnea screening    Preoperative Evaluation: Obstructive Sleep Apnea screening    S: Snore -  Do you snore loudly? (louder than talking or loud enough to be heard through closed doors) No  T: Tired - Do you often feel tired, fatigued, or sleepy during the daytime?No  O: Observed - Has anyone ever observed you stop breathing during your sleep?No  P: Pressure - Do you have or are you being treated for high blood pressure?No  B: BMI - BMI greater than 35kg/m2?No  A: Age - Age over 50 years old?No  N: Neck - Neck circumference greater than 40 cm?No  G: Gender - Gender: Male?No    Total number of \"YES\" responses:  0    Scoring: Low risk of ROBY 0-2  At Risk of ROBY: >3 High Risk of ROBY: 5-8      Total yes answers in ROBY section:    Low risk 0-2  At risk 3-4  High risk 5-8    Bethany Diaz RN............. 10/13/2021 10:47 AM     "

## 2021-10-19 ENCOUNTER — PRENATAL OFFICE VISIT (OUTPATIENT)
Dept: OBGYN | Facility: OTHER | Age: 31
End: 2021-10-19
Attending: STUDENT IN AN ORGANIZED HEALTH CARE EDUCATION/TRAINING PROGRAM
Payer: OTHER GOVERNMENT

## 2021-10-19 VITALS
SYSTOLIC BLOOD PRESSURE: 102 MMHG | BODY MASS INDEX: 30.55 KG/M2 | WEIGHT: 178 LBS | DIASTOLIC BLOOD PRESSURE: 60 MMHG | HEART RATE: 76 BPM

## 2021-10-19 DIAGNOSIS — O43.123 VELAMENTOUS INSERTION OF UMBILICAL CORD IN THIRD TRIMESTER: Primary | ICD-10-CM

## 2021-10-19 PROCEDURE — 99207 PR OB VISIT-NO CHARGE - GICH ONLY: CPT | Performed by: OBSTETRICS & GYNECOLOGY

## 2021-10-19 ASSESSMENT — PAIN SCALES - GENERAL: PAINLEVEL: NO PAIN (0)

## 2021-10-19 NOTE — PROGRESS NOTES
Return OB Visit    S: Patient is feeling well. Some ctx, irregular. No VB or LOF. +FM    O: /60 (BP Location: Right arm, Patient Position: Sitting, Cuff Size: Adult Regular)   Pulse 76   Wt 80.7 kg (178 lb)   LMP 2021   Breastfeeding No   BMI 30.55 kg/m    Gen: Well-appearing, NAD  See OB Flowsheet    A/P:  Lianne Martinez is a 31 year old  at 35w2d by LMP c/w 7wk US, here for return OB visit.  Velamentous cord insertion: growth US 10/13/2021 2292g (29%ile)  Prior  section x1: repeat scheduled for 11/15/21  Declines sterilization. Reviewed contraception options  Plans breastfeeding     PNC: Rh negative s/p Rhogam, Rubella immune,   Genetics: declined  Imaging: dating US at 7w0d, anatomy survey with questionable CPC, resolved on MFM scan  Immunizations: s/p COVID series, Tdap  RTC 1 weeks with ALD, GBS next visit    Tarah Garrison MD FACOG  OB/GYN  10/19/2021 10:16 AM

## 2021-10-19 NOTE — NURSING NOTE
Chief Complaint   Patient presents with     Prenatal Care     35W2D     Offers no complaints  Bri Gomez LPN........................10/19/2021  10:16 AM     Medication Reconciliation: completed   Bir Gomez LPN  10/19/2021 10:15 AM

## 2021-10-26 ENCOUNTER — PRENATAL OFFICE VISIT (OUTPATIENT)
Dept: OBGYN | Facility: OTHER | Age: 31
End: 2021-10-26
Attending: STUDENT IN AN ORGANIZED HEALTH CARE EDUCATION/TRAINING PROGRAM
Payer: OTHER GOVERNMENT

## 2021-10-26 VITALS
SYSTOLIC BLOOD PRESSURE: 118 MMHG | BODY MASS INDEX: 30.71 KG/M2 | HEART RATE: 92 BPM | WEIGHT: 178.9 LBS | DIASTOLIC BLOOD PRESSURE: 80 MMHG

## 2021-10-26 DIAGNOSIS — Z34.93 ENCOUNTER FOR PREGNANCY RELATED EXAMINATION IN THIRD TRIMESTER: Primary | ICD-10-CM

## 2021-10-26 DIAGNOSIS — O43.123 VELAMENTOUS INSERTION OF UMBILICAL CORD IN THIRD TRIMESTER: ICD-10-CM

## 2021-10-26 PROCEDURE — 87081 CULTURE SCREEN ONLY: CPT | Mod: ZL | Performed by: STUDENT IN AN ORGANIZED HEALTH CARE EDUCATION/TRAINING PROGRAM

## 2021-10-26 PROCEDURE — 99207 PR OB VISIT-NO CHARGE - GICH ONLY: CPT | Performed by: STUDENT IN AN ORGANIZED HEALTH CARE EDUCATION/TRAINING PROGRAM

## 2021-10-26 NOTE — PROGRESS NOTES
Return OB Visit    S: Ms. Martinez is feeling well today. She has no acute concerns. Denies leaking of fluid, vaginal bleeding, painful contractions. Notes fetal movements.    O: /80   Pulse 92   Wt 81.1 kg (178 lb 14.4 oz)   LMP 2021   BMI 30.71 kg/m    Gen: Well-appearing, NAD  See OB Flowsheet    FH 35 cm  FHT: 137 bpm    Assessment:  Ms. Lianne Martinez is a 30 year old yo  here today for an OB follow up. She is currently 36w2d. Her pregnancy is complicated by history of prior  section, B neg blood type, and velamentous cord insertion.     Plan:  # Routine Prenatal Care  -- Dating: LMP=7 week US OSCAR: 2021  -- PNLs:               B NEG, AB screen neg              RPR nr              Hep B S Ag neg              Rubella Immune              HIV neg in 2021                         Pap: NIL, HPV negative              GC/Chlam neg     -- Genetic Screening: Declined  -- Anatomy US: : Questionable left choroid cyst, follow up MFM scan ordered- resolved/not present  -- Immunizations: Tdap , Rhogam   -- 3rd TM labs including CBC, RPR: Hgb 12.1, RPR nr  -- 1 hr GTT: 118  -- GBS: Today  -- Postpartum Planning: To be discussed   -- Delivery Planning: Plan for RCS at 39 weeks  -- Return to clinic in 1 week for OB follow up visit  -- Planning to do at next visit: Confirm RCS scheduling for 11/15     # Previous CS due to breech  -- Plans for repeat at 39 weeks: paperwork pending for 11/15     # B NEG blood type  -- Rhogam given   -- s/p rhogam on  due to vaginal spotting     # Velamentous cord insertion  -- Growth scans q 3-4 weeks                16%Ile: AC 24%ile, BPD 58%ile, HC 31%ile, FL 9%ile               : 23%ile: AC 31%ile, BPD 67%ile, HC 38%ile, FL 10%ile               10/13: 29%ile, AC 37%ile, BPD 65%ile, HC 39%ile, FL 11%ile       Freida Williamson MD  OB/GYN  10/26/2021 10:13 AM

## 2021-10-26 NOTE — NURSING NOTE
Pt presents to clinic today for prenatal care 36w2d. Pt denies any contractions, bleeding, or leakage of fluid at this time. States baby is moving good.      Medication Reconciliation: complete  Erika Martinez LPN

## 2021-10-28 LAB — BACTERIA SPEC CULT: NORMAL

## 2021-11-02 ENCOUNTER — PRENATAL OFFICE VISIT (OUTPATIENT)
Dept: OBGYN | Facility: OTHER | Age: 31
End: 2021-11-02
Attending: STUDENT IN AN ORGANIZED HEALTH CARE EDUCATION/TRAINING PROGRAM
Payer: OTHER GOVERNMENT

## 2021-11-02 ENCOUNTER — HOSPITAL ENCOUNTER (OUTPATIENT)
Dept: ULTRASOUND IMAGING | Facility: OTHER | Age: 31
End: 2021-11-02
Attending: STUDENT IN AN ORGANIZED HEALTH CARE EDUCATION/TRAINING PROGRAM
Payer: OTHER GOVERNMENT

## 2021-11-02 VITALS
WEIGHT: 179.1 LBS | DIASTOLIC BLOOD PRESSURE: 72 MMHG | SYSTOLIC BLOOD PRESSURE: 106 MMHG | BODY MASS INDEX: 30.74 KG/M2 | HEART RATE: 98 BPM

## 2021-11-02 DIAGNOSIS — O43.123 VELAMENTOUS INSERTION OF UMBILICAL CORD IN THIRD TRIMESTER: ICD-10-CM

## 2021-11-02 DIAGNOSIS — Z98.891 HISTORY OF CESAREAN DELIVERY: ICD-10-CM

## 2021-11-02 DIAGNOSIS — N89.8 VAGINAL ITCHING: Primary | ICD-10-CM

## 2021-11-02 DIAGNOSIS — Z98.891 HISTORY OF CESAREAN SECTION: ICD-10-CM

## 2021-11-02 PROCEDURE — 99207 PR OB VISIT-NO CHARGE - GICH ONLY: CPT | Performed by: STUDENT IN AN ORGANIZED HEALTH CARE EDUCATION/TRAINING PROGRAM

## 2021-11-02 PROCEDURE — 76816 OB US FOLLOW-UP PER FETUS: CPT

## 2021-11-02 NOTE — PROGRESS NOTES
Return OB Visit    S: Ms. Martinez is feeling well today. She has no acute concerns. Denies leaking of fluid, vaginal bleeding, painful contractions. Notes fetal movements.    O: /72   Pulse 98   Wt 81.2 kg (179 lb 1.6 oz)   LMP 2021   BMI 30.74 kg/m    Gen: Well-appearing, NAD      Growth US today shows 24%ile and 149 bpm   SVE closed    Assessment:  Ms. Lianne Martinez is a 30 year old yo  here today for an OB follow up. She is currently 37w2d. Her pregnancy is complicated by history of prior  section, B neg blood type, and velamentous cord insertion.     Plan:  # Routine Prenatal Care  -- Dating: LMP=7 week US OSCAR: 2021  -- PNLs:               B NEG, AB screen neg              RPR nr              Hep B S Ag neg              Rubella Immune              HIV neg in 2021                         Pap: NIL, HPV negative              GC/Chlam neg     -- Genetic Screening: Declined  -- Anatomy US: : Questionable left choroid cyst, follow up MFM scan ordered- resolved/not present  -- Immunizations: Tdap , Rhogam   -- 3rd TM labs including CBC, RPR: Hgb 12.1, RPR nr  -- 1 hr GTT: 118  -- GBS: Negative  -- Postpartum Planning: Breastfeeding  -- Delivery Planning: Plan for RCS at 39 weeks (confirmed for 11/15 7:30 am)  -- Return to clinic in 1 week for OB follow up visit     # Previous CS due to breech  -- Plans for repeat at 39 weeks:11/15 7:30 am     # B NEG blood type  -- Rhogam given   -- s/p rhogam on  due to vaginal spotting     # Velamentous cord insertion  -- Growth scans q 3-4 weeks                16%Ile: AC 24%ile, BPD 58%ile, HC 31%ile, FL 9%ile               : 23%ile: AC 31%ile, BPD 67%ile, HC 38%ile, FL 10%ile               10/13: 29%ile, AC 37%ile, BPD 65%ile, HC 39%ile, FL 11%ile   : 24%ile, AC 24%ile    Freida Williamson MD  OB/GYN  2021 10:06 AM

## 2021-11-02 NOTE — NURSING NOTE
Pt presents to clinic today for prenatal care 37w2d. Pt denies any bleeding, or leakage of fluid at this time. Pt baby is moving good and has noticed some contractions and discharge.      Medication Reconciliation: complete  Erika Martinez LPN

## 2021-11-09 ENCOUNTER — PRENATAL OFFICE VISIT (OUTPATIENT)
Dept: OBGYN | Facility: OTHER | Age: 31
End: 2021-11-09
Attending: STUDENT IN AN ORGANIZED HEALTH CARE EDUCATION/TRAINING PROGRAM
Payer: OTHER GOVERNMENT

## 2021-11-09 VITALS
HEART RATE: 96 BPM | WEIGHT: 180 LBS | SYSTOLIC BLOOD PRESSURE: 112 MMHG | BODY MASS INDEX: 30.9 KG/M2 | DIASTOLIC BLOOD PRESSURE: 68 MMHG

## 2021-11-09 DIAGNOSIS — O43.123 VELAMENTOUS INSERTION OF UMBILICAL CORD IN THIRD TRIMESTER: Primary | ICD-10-CM

## 2021-11-09 PROCEDURE — 99207 PR OB VISIT-NO CHARGE - GICH ONLY: CPT | Performed by: OBSTETRICS & GYNECOLOGY

## 2021-11-09 ASSESSMENT — PAIN SCALES - GENERAL: PAINLEVEL: NO PAIN (0)

## 2021-11-09 NOTE — PROGRESS NOTES
Return OB Visit    S: Patient is doing well. Irregular ctx. No VB or LOF. +FM    O: /68 (BP Location: Right arm, Patient Position: Sitting, Cuff Size: Adult Regular)   Pulse 96   Wt 81.6 kg (180 lb)   LMP 2021   Breastfeeding No   BMI 30.90 kg/m    Gen: Well-appearing, NAD  See OB Flowsheet    A/P:  Lianne Martinez is a 31 year old  at 38w2d by LMP c/w 7wk US, here for return OB visit.  Velamentous cord insertion: growth US 10/13/2021 2292g (29%ile)  Prior  section x1: repeat scheduled for 11/15/21  Declines sterilization. Reviewed contraception options  Plans breastfeeding     PNC: Rh negative s/p Rhogam, Rubella immune, , GBS negative  Genetics: declined  Imaging: dating US at 7w0d, anatomy survey with questionable CPC, resolved on MFM scan  Immunizations: s/p COVID series, Tdap  C/S next week    Tarah Garrison MD FACOG  OB/GYN  2021 9:21 AM

## 2021-11-09 NOTE — NURSING NOTE
Offers no complaints  Bri Gomez LPN........................11/9/2021  8:50 AM       Chief Complaint   Patient presents with     Prenatal Care     38w2d       Medication Reconciliation: completed   Bri Gomez LPN  11/9/2021 8:50 AM

## 2021-11-10 ENCOUNTER — NURSE TRIAGE (OUTPATIENT)
Dept: OBGYN | Facility: OTHER | Age: 31
End: 2021-11-10
Payer: OTHER GOVERNMENT

## 2021-11-10 ENCOUNTER — LAB (OUTPATIENT)
Dept: LAB | Facility: OTHER | Age: 31
End: 2021-11-10
Attending: OBSTETRICS & GYNECOLOGY
Payer: OTHER GOVERNMENT

## 2021-11-10 ENCOUNTER — ALLIED HEALTH/NURSE VISIT (OUTPATIENT)
Dept: OBGYN | Facility: OTHER | Age: 31
End: 2021-11-10
Attending: OBSTETRICS & GYNECOLOGY
Payer: OTHER GOVERNMENT

## 2021-11-10 ENCOUNTER — TELEPHONE (OUTPATIENT)
Dept: OBGYN | Facility: OTHER | Age: 31
End: 2021-11-10

## 2021-11-10 DIAGNOSIS — L29.9 ITCHING: Primary | ICD-10-CM

## 2021-11-10 DIAGNOSIS — L29.9 ITCHING: ICD-10-CM

## 2021-11-10 DIAGNOSIS — O43.123 VELAMENTOUS INSERTION OF UMBILICAL CORD IN THIRD TRIMESTER: ICD-10-CM

## 2021-11-10 LAB
ALBUMIN SERPL-MCNC: 3.3 G/DL (ref 3.5–5.7)
ALP SERPL-CCNC: 912 U/L (ref 34–104)
ALT SERPL W P-5'-P-CCNC: 32 U/L (ref 7–52)
AST SERPL W P-5'-P-CCNC: 30 U/L (ref 13–39)
BILIRUB DIRECT SERPL-MCNC: 0.1 MG/DL (ref 0–0.2)
BILIRUB SERPL-MCNC: 0.4 MG/DL (ref 0.3–1)
PROT SERPL-MCNC: 5.9 G/DL (ref 6.4–8.9)

## 2021-11-10 PROCEDURE — 99207 PR FETAL NON-STRESS TEST: CPT

## 2021-11-10 PROCEDURE — 36415 COLL VENOUS BLD VENIPUNCTURE: CPT | Mod: ZL

## 2021-11-10 PROCEDURE — 80076 HEPATIC FUNCTION PANEL: CPT | Mod: ZL

## 2021-11-10 PROCEDURE — 83789 MASS SPECTROMETRY QUAL/QUAN: CPT | Mod: ZL

## 2021-11-10 NOTE — TELEPHONE ENCOUNTER
Please have patient come in for labs today (ordered) and a nurse NST. This is suspicious for cholestasis of pregnancy. Will see what her initial labs show.    Tarah Garrison MD FACOG  OB/GYN  11/10/2021 9:42 AM

## 2021-11-10 NOTE — TELEPHONE ENCOUNTER
Patient called with concerns with having itching on her hands (bilateral palms) and on the bottom of her feet. States it happens at night and has been for about a week now but was thinking it was just dry skin. States she hasn't noticed any change in color of her urine and otherwise feels fine.   Will discuss with MD and get back to patient.   Patient voiced understanding with no further questions.  Margie Garrison RN on 11/10/2021 at 8:46 AM

## 2021-11-10 NOTE — TELEPHONE ENCOUNTER
Per Dr. CHAO she states this is likely elevated due to pregnancy itself. She has no concerns. Reassured pt.     Jody Jim RN on 11/10/2021 at 3:35 PM

## 2021-11-10 NOTE — TELEPHONE ENCOUNTER
Patient called and advised to come in for lab work and a NST today. Patient voiced understanding and transferred to our . No further questions or concerns.   Margie Garrisno RN on 11/10/2021 at 9:50 AM

## 2021-11-10 NOTE — PROGRESS NOTES
Pt follows up for itching and nst. Noted to have reactive and reassuring NST. Discussed normal heart rate for baby. Discussed ideally above 110 is the goal and that position changes often help if lower heart rate is noted. Discussed if low heart rate that [facility] would be the place for evaluation. She is in agreement with this plan. Per JEREMIE OB check with ALD tomorrow.     Jody Jim RN on 11/10/2021 at 11:54 AM

## 2021-11-10 NOTE — TELEPHONE ENCOUNTER
Discussed with patient that Dr. Garrison was reassured with normal AST and ALT plus bilirubin. The pt is concerned however about the elevated Alkaline phosphatase. She is wondering Dr. Garrisons thoughts on this lab.     Jody Jim RN on 11/10/2021 at 3:02 PM

## 2021-11-11 ENCOUNTER — ANESTHESIA (OUTPATIENT)
Dept: SURGERY | Facility: OTHER | Age: 31
End: 2021-11-11
Payer: OTHER GOVERNMENT

## 2021-11-11 ENCOUNTER — HOSPITAL ENCOUNTER (INPATIENT)
Facility: OTHER | Age: 31
LOS: 2 days | Discharge: HOME | End: 2021-11-13
Attending: STUDENT IN AN ORGANIZED HEALTH CARE EDUCATION/TRAINING PROGRAM | Admitting: STUDENT IN AN ORGANIZED HEALTH CARE EDUCATION/TRAINING PROGRAM
Payer: OTHER GOVERNMENT

## 2021-11-11 ENCOUNTER — TELEPHONE (OUTPATIENT)
Dept: OBGYN | Facility: OTHER | Age: 31
End: 2021-11-11
Payer: OTHER GOVERNMENT

## 2021-11-11 ENCOUNTER — MEDICAL CORRESPONDENCE (OUTPATIENT)
Dept: HEALTH INFORMATION MANAGEMENT | Facility: OTHER | Age: 31
End: 2021-11-11

## 2021-11-11 ENCOUNTER — ANESTHESIA EVENT (OUTPATIENT)
Dept: SURGERY | Facility: OTHER | Age: 31
End: 2021-11-11
Payer: OTHER GOVERNMENT

## 2021-11-11 DIAGNOSIS — Z98.891 HISTORY OF CESAREAN SECTION: ICD-10-CM

## 2021-11-11 LAB
ABO/RH(D): ABNORMAL
ANTIBODY SCREEN: POSITIVE
HGB BLD-MCNC: 12 G/DL (ref 11.7–15.7)
SARS-COV-2 RNA RESP QL NAA+PROBE: NEGATIVE
SPECIMEN EXPIRATION DATE: ABNORMAL

## 2021-11-11 PROCEDURE — 85018 HEMOGLOBIN: CPT | Performed by: STUDENT IN AN ORGANIZED HEALTH CARE EDUCATION/TRAINING PROGRAM

## 2021-11-11 PROCEDURE — 272N000001 HC OR GENERAL SUPPLY STERILE: Performed by: STUDENT IN AN ORGANIZED HEALTH CARE EDUCATION/TRAINING PROGRAM

## 2021-11-11 PROCEDURE — 59510 CESAREAN DELIVERY: CPT | Performed by: STUDENT IN AN ORGANIZED HEALTH CARE EDUCATION/TRAINING PROGRAM

## 2021-11-11 PROCEDURE — 360N000076 HC SURGERY LEVEL 3, PER MIN: Performed by: STUDENT IN AN ORGANIZED HEALTH CARE EDUCATION/TRAINING PROGRAM

## 2021-11-11 PROCEDURE — 64488 TAP BLOCK BI INJECTION: CPT | Mod: XU | Performed by: NURSE ANESTHETIST, CERTIFIED REGISTERED

## 2021-11-11 PROCEDURE — 120N000001 HC R&B MED SURG/OB

## 2021-11-11 PROCEDURE — 370N000017 HC ANESTHESIA TECHNICAL FEE, PER MIN: Performed by: STUDENT IN AN ORGANIZED HEALTH CARE EDUCATION/TRAINING PROGRAM

## 2021-11-11 PROCEDURE — 250N000011 HC RX IP 250 OP 636: Performed by: OBSTETRICS & GYNECOLOGY

## 2021-11-11 PROCEDURE — U0005 INFEC AGEN DETEC AMPLI PROBE: HCPCS | Performed by: STUDENT IN AN ORGANIZED HEALTH CARE EDUCATION/TRAINING PROGRAM

## 2021-11-11 PROCEDURE — 250N000009 HC RX 250: Performed by: NURSE ANESTHETIST, CERTIFIED REGISTERED

## 2021-11-11 PROCEDURE — 250N000009 HC RX 250: Performed by: STUDENT IN AN ORGANIZED HEALTH CARE EDUCATION/TRAINING PROGRAM

## 2021-11-11 PROCEDURE — 250N000011 HC RX IP 250 OP 636: Performed by: NURSE ANESTHETIST, CERTIFIED REGISTERED

## 2021-11-11 PROCEDURE — 250N000011 HC RX IP 250 OP 636: Performed by: STUDENT IN AN ORGANIZED HEALTH CARE EDUCATION/TRAINING PROGRAM

## 2021-11-11 PROCEDURE — 258N000003 HC RX IP 258 OP 636: Performed by: OBSTETRICS & GYNECOLOGY

## 2021-11-11 PROCEDURE — 258N000003 HC RX IP 258 OP 636: Performed by: STUDENT IN AN ORGANIZED HEALTH CARE EDUCATION/TRAINING PROGRAM

## 2021-11-11 PROCEDURE — 710N000010 HC RECOVERY PHASE 1, LEVEL 2, PER MIN: Performed by: STUDENT IN AN ORGANIZED HEALTH CARE EDUCATION/TRAINING PROGRAM

## 2021-11-11 PROCEDURE — 36415 COLL VENOUS BLD VENIPUNCTURE: CPT | Performed by: OBSTETRICS & GYNECOLOGY

## 2021-11-11 PROCEDURE — 258N000003 HC RX IP 258 OP 636: Performed by: NURSE ANESTHETIST, CERTIFIED REGISTERED

## 2021-11-11 PROCEDURE — 86900 BLOOD TYPING SEROLOGIC ABO: CPT | Performed by: OBSTETRICS & GYNECOLOGY

## 2021-11-11 PROCEDURE — 86780 TREPONEMA PALLIDUM: CPT | Performed by: OBSTETRICS & GYNECOLOGY

## 2021-11-11 PROCEDURE — 59510 CESAREAN DELIVERY: CPT | Performed by: NURSE ANESTHETIST, CERTIFIED REGISTERED

## 2021-11-11 PROCEDURE — 250N000013 HC RX MED GY IP 250 OP 250 PS 637: Performed by: STUDENT IN AN ORGANIZED HEALTH CARE EDUCATION/TRAINING PROGRAM

## 2021-11-11 RX ORDER — TRANEXAMIC ACID 10 MG/ML
1 INJECTION, SOLUTION INTRAVENOUS EVERY 30 MIN PRN
Status: DISCONTINUED | OUTPATIENT
Start: 2021-11-11 | End: 2021-11-11 | Stop reason: HOSPADM

## 2021-11-11 RX ORDER — NALOXONE HYDROCHLORIDE 0.4 MG/ML
0.2 INJECTION, SOLUTION INTRAMUSCULAR; INTRAVENOUS; SUBCUTANEOUS
Status: DISCONTINUED | OUTPATIENT
Start: 2021-11-11 | End: 2021-11-13 | Stop reason: HOSPADM

## 2021-11-11 RX ORDER — CITRIC ACID/SODIUM CITRATE 334-500MG
30 SOLUTION, ORAL ORAL
Status: DISCONTINUED | OUTPATIENT
Start: 2021-11-11 | End: 2021-11-11 | Stop reason: HOSPADM

## 2021-11-11 RX ORDER — LIDOCAINE 40 MG/G
CREAM TOPICAL
Status: CANCELLED | OUTPATIENT
Start: 2021-11-11

## 2021-11-11 RX ORDER — CLINDAMYCIN PHOSPHATE 900 MG/50ML
900 INJECTION, SOLUTION INTRAVENOUS
Status: CANCELLED | OUTPATIENT
Start: 2021-11-11

## 2021-11-11 RX ORDER — SODIUM CHLORIDE, SODIUM LACTATE, POTASSIUM CHLORIDE, CALCIUM CHLORIDE 600; 310; 30; 20 MG/100ML; MG/100ML; MG/100ML; MG/100ML
INJECTION, SOLUTION INTRAVENOUS CONTINUOUS
Status: CANCELLED | OUTPATIENT
Start: 2021-11-11

## 2021-11-11 RX ORDER — PROCHLORPERAZINE 25 MG
25 SUPPOSITORY, RECTAL RECTAL EVERY 12 HOURS PRN
Status: DISCONTINUED | OUTPATIENT
Start: 2021-11-11 | End: 2021-11-13 | Stop reason: HOSPADM

## 2021-11-11 RX ORDER — CARBOPROST TROMETHAMINE 250 UG/ML
250 INJECTION, SOLUTION INTRAMUSCULAR
Status: DISCONTINUED | OUTPATIENT
Start: 2021-11-11 | End: 2021-11-11 | Stop reason: HOSPADM

## 2021-11-11 RX ORDER — HYDROCORTISONE 2.5 %
CREAM (GRAM) TOPICAL 3 TIMES DAILY PRN
Status: DISCONTINUED | OUTPATIENT
Start: 2021-11-11 | End: 2021-11-13 | Stop reason: HOSPADM

## 2021-11-11 RX ORDER — OXYTOCIN/0.9 % SODIUM CHLORIDE 30/500 ML
340 PLASTIC BAG, INJECTION (ML) INTRAVENOUS CONTINUOUS PRN
Status: CANCELLED | OUTPATIENT
Start: 2021-11-11

## 2021-11-11 RX ORDER — OXYTOCIN/0.9 % SODIUM CHLORIDE 30/500 ML
340 PLASTIC BAG, INJECTION (ML) INTRAVENOUS CONTINUOUS PRN
Status: DISCONTINUED | OUTPATIENT
Start: 2021-11-11 | End: 2021-11-11 | Stop reason: HOSPADM

## 2021-11-11 RX ORDER — OXYTOCIN 10 [USP'U]/ML
10 INJECTION, SOLUTION INTRAMUSCULAR; INTRAVENOUS
Status: DISCONTINUED | OUTPATIENT
Start: 2021-11-11 | End: 2021-11-13 | Stop reason: HOSPADM

## 2021-11-11 RX ORDER — METOCLOPRAMIDE HYDROCHLORIDE 5 MG/ML
10 INJECTION INTRAMUSCULAR; INTRAVENOUS EVERY 6 HOURS PRN
Status: DISCONTINUED | OUTPATIENT
Start: 2021-11-11 | End: 2021-11-13 | Stop reason: HOSPADM

## 2021-11-11 RX ORDER — ACETAMINOPHEN 325 MG/1
975 TABLET ORAL ONCE
Status: DISCONTINUED | OUTPATIENT
Start: 2021-11-11 | End: 2021-11-11 | Stop reason: HOSPADM

## 2021-11-11 RX ORDER — METHYLERGONOVINE MALEATE 0.2 MG/ML
200 INJECTION INTRAVENOUS
Status: DISCONTINUED | OUTPATIENT
Start: 2021-11-11 | End: 2021-11-11 | Stop reason: HOSPADM

## 2021-11-11 RX ORDER — IBUPROFEN 400 MG/1
800 TABLET, FILM COATED ORAL EVERY 6 HOURS
Status: DISCONTINUED | OUTPATIENT
Start: 2021-11-12 | End: 2021-11-13 | Stop reason: HOSPADM

## 2021-11-11 RX ORDER — NALOXONE HYDROCHLORIDE 0.4 MG/ML
0.4 INJECTION, SOLUTION INTRAMUSCULAR; INTRAVENOUS; SUBCUTANEOUS
Status: DISCONTINUED | OUTPATIENT
Start: 2021-11-11 | End: 2021-11-13 | Stop reason: HOSPADM

## 2021-11-11 RX ORDER — MAGNESIUM HYDROXIDE 1200 MG/15ML
LIQUID ORAL PRN
Status: DISCONTINUED | OUTPATIENT
Start: 2021-11-11 | End: 2021-11-11 | Stop reason: HOSPADM

## 2021-11-11 RX ORDER — SODIUM CHLORIDE, SODIUM LACTATE, POTASSIUM CHLORIDE, CALCIUM CHLORIDE 600; 310; 30; 20 MG/100ML; MG/100ML; MG/100ML; MG/100ML
INJECTION, SOLUTION INTRAVENOUS CONTINUOUS
Status: DISCONTINUED | OUTPATIENT
Start: 2021-11-11 | End: 2021-11-11 | Stop reason: HOSPADM

## 2021-11-11 RX ORDER — KETOROLAC TROMETHAMINE 30 MG/ML
INJECTION, SOLUTION INTRAMUSCULAR; INTRAVENOUS PRN
Status: DISCONTINUED | OUTPATIENT
Start: 2021-11-11 | End: 2021-11-11

## 2021-11-11 RX ORDER — METHYLERGONOVINE MALEATE 0.2 MG/ML
200 INJECTION INTRAVENOUS
Status: CANCELLED | OUTPATIENT
Start: 2021-11-11

## 2021-11-11 RX ORDER — OXYTOCIN 10 [USP'U]/ML
10 INJECTION, SOLUTION INTRAMUSCULAR; INTRAVENOUS
Status: DISCONTINUED | OUTPATIENT
Start: 2021-11-11 | End: 2021-11-11 | Stop reason: HOSPADM

## 2021-11-11 RX ORDER — OXYTOCIN/0.9 % SODIUM CHLORIDE 30/500 ML
100-340 PLASTIC BAG, INJECTION (ML) INTRAVENOUS CONTINUOUS PRN
Status: DISCONTINUED | OUTPATIENT
Start: 2021-11-11 | End: 2021-11-11

## 2021-11-11 RX ORDER — SODIUM CHLORIDE, SODIUM LACTATE, POTASSIUM CHLORIDE, CALCIUM CHLORIDE 600; 310; 30; 20 MG/100ML; MG/100ML; MG/100ML; MG/100ML
INJECTION, SOLUTION INTRAVENOUS CONTINUOUS PRN
Status: DISCONTINUED | OUTPATIENT
Start: 2021-11-11 | End: 2021-11-11

## 2021-11-11 RX ORDER — AMOXICILLIN 250 MG
1 CAPSULE ORAL 2 TIMES DAILY
Status: DISCONTINUED | OUTPATIENT
Start: 2021-11-11 | End: 2021-11-13 | Stop reason: HOSPADM

## 2021-11-11 RX ORDER — BISACODYL 10 MG
10 SUPPOSITORY, RECTAL RECTAL DAILY PRN
Status: DISCONTINUED | OUTPATIENT
Start: 2021-11-13 | End: 2021-11-13 | Stop reason: HOSPADM

## 2021-11-11 RX ORDER — OXYTOCIN/0.9 % SODIUM CHLORIDE 30/500 ML
PLASTIC BAG, INJECTION (ML) INTRAVENOUS CONTINUOUS PRN
Status: DISCONTINUED | OUTPATIENT
Start: 2021-11-11 | End: 2021-11-11

## 2021-11-11 RX ORDER — BUPIVACAINE HYDROCHLORIDE 7.5 MG/ML
INJECTION, SOLUTION INTRASPINAL PRN
Status: DISCONTINUED | OUTPATIENT
Start: 2021-11-11 | End: 2021-11-11

## 2021-11-11 RX ORDER — DEXAMETHASONE SODIUM PHOSPHATE 10 MG/ML
INJECTION, SOLUTION INTRAMUSCULAR; INTRAVENOUS PRN
Status: DISCONTINUED | OUTPATIENT
Start: 2021-11-11 | End: 2021-11-11

## 2021-11-11 RX ORDER — MISOPROSTOL 100 UG/1
400 TABLET ORAL
Status: CANCELLED | OUTPATIENT
Start: 2021-11-11

## 2021-11-11 RX ORDER — OXYTOCIN 10 [USP'U]/ML
10 INJECTION, SOLUTION INTRAMUSCULAR; INTRAVENOUS
Status: CANCELLED | OUTPATIENT
Start: 2021-11-11

## 2021-11-11 RX ORDER — OXYTOCIN/0.9 % SODIUM CHLORIDE 30/500 ML
340 PLASTIC BAG, INJECTION (ML) INTRAVENOUS CONTINUOUS PRN
Status: DISCONTINUED | OUTPATIENT
Start: 2021-11-11 | End: 2021-11-13 | Stop reason: HOSPADM

## 2021-11-11 RX ORDER — TRANEXAMIC ACID 10 MG/ML
1 INJECTION, SOLUTION INTRAVENOUS EVERY 30 MIN PRN
Status: DISCONTINUED | OUTPATIENT
Start: 2021-11-11 | End: 2021-11-13 | Stop reason: HOSPADM

## 2021-11-11 RX ORDER — LIDOCAINE 40 MG/G
CREAM TOPICAL
Status: DISCONTINUED | OUTPATIENT
Start: 2021-11-11 | End: 2021-11-11 | Stop reason: HOSPADM

## 2021-11-11 RX ORDER — CITRIC ACID/SODIUM CITRATE 334-500MG
30 SOLUTION, ORAL ORAL
Status: CANCELLED | OUTPATIENT
Start: 2021-11-11

## 2021-11-11 RX ORDER — CLINDAMYCIN PHOSPHATE 900 MG/50ML
900 INJECTION, SOLUTION INTRAVENOUS
Status: DISCONTINUED | OUTPATIENT
Start: 2021-11-11 | End: 2021-11-11 | Stop reason: HOSPADM

## 2021-11-11 RX ORDER — AMOXICILLIN 250 MG
2 CAPSULE ORAL 2 TIMES DAILY
Status: DISCONTINUED | OUTPATIENT
Start: 2021-11-11 | End: 2021-11-13 | Stop reason: HOSPADM

## 2021-11-11 RX ORDER — METOCLOPRAMIDE 10 MG/1
10 TABLET ORAL EVERY 6 HOURS PRN
Status: DISCONTINUED | OUTPATIENT
Start: 2021-11-11 | End: 2021-11-13 | Stop reason: HOSPADM

## 2021-11-11 RX ORDER — ONDANSETRON 2 MG/ML
INJECTION INTRAMUSCULAR; INTRAVENOUS PRN
Status: DISCONTINUED | OUTPATIENT
Start: 2021-11-11 | End: 2021-11-11

## 2021-11-11 RX ORDER — CITRIC ACID/SODIUM CITRATE 334-500MG
30 SOLUTION, ORAL ORAL
Status: COMPLETED | OUTPATIENT
Start: 2021-11-11 | End: 2021-11-11

## 2021-11-11 RX ORDER — CARBOPROST TROMETHAMINE 250 UG/ML
250 INJECTION, SOLUTION INTRAMUSCULAR
Status: CANCELLED | OUTPATIENT
Start: 2021-11-11

## 2021-11-11 RX ORDER — BUPIVACAINE HYDROCHLORIDE 2.5 MG/ML
INJECTION, SOLUTION EPIDURAL; INFILTRATION; INTRACAUDAL PRN
Status: DISCONTINUED | OUTPATIENT
Start: 2021-11-11 | End: 2021-11-11

## 2021-11-11 RX ORDER — OXYTOCIN 10 [USP'U]/ML
10 INJECTION, SOLUTION INTRAMUSCULAR; INTRAVENOUS
Status: DISCONTINUED | OUTPATIENT
Start: 2021-11-11 | End: 2021-11-11

## 2021-11-11 RX ORDER — LIDOCAINE 40 MG/G
CREAM TOPICAL
Status: DISCONTINUED | OUTPATIENT
Start: 2021-11-11 | End: 2021-11-13 | Stop reason: HOSPADM

## 2021-11-11 RX ORDER — PROCHLORPERAZINE MALEATE 10 MG
10 TABLET ORAL EVERY 6 HOURS PRN
Status: DISCONTINUED | OUTPATIENT
Start: 2021-11-11 | End: 2021-11-13 | Stop reason: HOSPADM

## 2021-11-11 RX ORDER — LIDOCAINE HYDROCHLORIDE 10 MG/ML
INJECTION, SOLUTION INFILTRATION; PERINEURAL PRN
Status: DISCONTINUED | OUTPATIENT
Start: 2021-11-11 | End: 2021-11-11

## 2021-11-11 RX ORDER — ACETAMINOPHEN 325 MG/1
975 TABLET ORAL ONCE
Status: CANCELLED | OUTPATIENT
Start: 2021-11-11 | End: 2021-11-11

## 2021-11-11 RX ORDER — OXYTOCIN/0.9 % SODIUM CHLORIDE 30/500 ML
100-340 PLASTIC BAG, INJECTION (ML) INTRAVENOUS CONTINUOUS PRN
Status: CANCELLED | OUTPATIENT
Start: 2021-11-11

## 2021-11-11 RX ORDER — TRANEXAMIC ACID 10 MG/ML
1 INJECTION, SOLUTION INTRAVENOUS EVERY 30 MIN PRN
Status: CANCELLED | OUTPATIENT
Start: 2021-11-11

## 2021-11-11 RX ORDER — ACETAMINOPHEN 325 MG/1
975 TABLET ORAL EVERY 6 HOURS
Status: DISCONTINUED | OUTPATIENT
Start: 2021-11-11 | End: 2021-11-13 | Stop reason: HOSPADM

## 2021-11-11 RX ORDER — ONDANSETRON 2 MG/ML
4 INJECTION INTRAMUSCULAR; INTRAVENOUS EVERY 6 HOURS PRN
Status: DISCONTINUED | OUTPATIENT
Start: 2021-11-11 | End: 2021-11-13 | Stop reason: HOSPADM

## 2021-11-11 RX ORDER — DEXAMETHASONE SODIUM PHOSPHATE 4 MG/ML
INJECTION, SOLUTION INTRA-ARTICULAR; INTRALESIONAL; INTRAMUSCULAR; INTRAVENOUS; SOFT TISSUE PRN
Status: DISCONTINUED | OUTPATIENT
Start: 2021-11-11 | End: 2021-11-11

## 2021-11-11 RX ORDER — SIMETHICONE 80 MG
80 TABLET,CHEWABLE ORAL 4 TIMES DAILY PRN
Status: DISCONTINUED | OUTPATIENT
Start: 2021-11-11 | End: 2021-11-13 | Stop reason: HOSPADM

## 2021-11-11 RX ORDER — ONDANSETRON 4 MG/1
4 TABLET, ORALLY DISINTEGRATING ORAL EVERY 6 HOURS PRN
Status: DISCONTINUED | OUTPATIENT
Start: 2021-11-11 | End: 2021-11-13 | Stop reason: HOSPADM

## 2021-11-11 RX ORDER — METHYLERGONOVINE MALEATE 0.2 MG/ML
200 INJECTION INTRAVENOUS
Status: DISCONTINUED | OUTPATIENT
Start: 2021-11-11 | End: 2021-11-13 | Stop reason: HOSPADM

## 2021-11-11 RX ORDER — OXYCODONE HYDROCHLORIDE 5 MG/1
5 TABLET ORAL EVERY 4 HOURS PRN
Status: DISCONTINUED | OUTPATIENT
Start: 2021-11-11 | End: 2021-11-13 | Stop reason: HOSPADM

## 2021-11-11 RX ORDER — MISOPROSTOL 100 UG/1
400 TABLET ORAL
Status: DISCONTINUED | OUTPATIENT
Start: 2021-11-11 | End: 2021-11-11 | Stop reason: HOSPADM

## 2021-11-11 RX ORDER — CARBOPROST TROMETHAMINE 250 UG/ML
250 INJECTION, SOLUTION INTRAMUSCULAR
Status: DISCONTINUED | OUTPATIENT
Start: 2021-11-11 | End: 2021-11-13 | Stop reason: HOSPADM

## 2021-11-11 RX ORDER — DEXTROSE, SODIUM CHLORIDE, SODIUM LACTATE, POTASSIUM CHLORIDE, AND CALCIUM CHLORIDE 5; .6; .31; .03; .02 G/100ML; G/100ML; G/100ML; G/100ML; G/100ML
INJECTION, SOLUTION INTRAVENOUS CONTINUOUS
Status: DISCONTINUED | OUTPATIENT
Start: 2021-11-11 | End: 2021-11-13 | Stop reason: HOSPADM

## 2021-11-11 RX ORDER — MODIFIED LANOLIN
OINTMENT (GRAM) TOPICAL
Status: DISCONTINUED | OUTPATIENT
Start: 2021-11-11 | End: 2021-11-13 | Stop reason: HOSPADM

## 2021-11-11 RX ORDER — KETOROLAC TROMETHAMINE 30 MG/ML
30 INJECTION, SOLUTION INTRAMUSCULAR; INTRAVENOUS EVERY 6 HOURS
Status: COMPLETED | OUTPATIENT
Start: 2021-11-12 | End: 2021-11-12

## 2021-11-11 RX ORDER — MISOPROSTOL 100 UG/1
400 TABLET ORAL
Status: DISCONTINUED | OUTPATIENT
Start: 2021-11-11 | End: 2021-11-13 | Stop reason: HOSPADM

## 2021-11-11 RX ADMIN — SODIUM CHLORIDE, SODIUM LACTATE, POTASSIUM CHLORIDE, AND CALCIUM CHLORIDE: 600; 310; 30; 20 INJECTION, SOLUTION INTRAVENOUS at 17:55

## 2021-11-11 RX ADMIN — BUPIVACAINE HYDROCHLORIDE 20 ML: 2.5 INJECTION, SOLUTION EPIDURAL; INFILTRATION; INTRACAUDAL; PERINEURAL at 19:11

## 2021-11-11 RX ADMIN — DEXAMETHASONE SODIUM PHOSPHATE 4 MG: 10 INJECTION, SOLUTION INTRAMUSCULAR; INTRAVENOUS at 19:11

## 2021-11-11 RX ADMIN — SODIUM CHLORIDE, POTASSIUM CHLORIDE, SODIUM LACTATE AND CALCIUM CHLORIDE 1000 ML: 600; 310; 30; 20 INJECTION, SOLUTION INTRAVENOUS at 15:16

## 2021-11-11 RX ADMIN — BUPIVACAINE HYDROCHLORIDE 1.8 ML: 7.5 INJECTION, SOLUTION INTRASPINAL at 18:05

## 2021-11-11 RX ADMIN — Medication 340 ML/HR: at 18:26

## 2021-11-11 RX ADMIN — KETOROLAC TROMETHAMINE 30 MG: 30 INJECTION, SOLUTION INTRAMUSCULAR at 18:39

## 2021-11-11 RX ADMIN — DEXAMETHASONE SODIUM PHOSPHATE 4 MG: 4 INJECTION, SOLUTION INTRAMUSCULAR; INTRAVENOUS at 19:05

## 2021-11-11 RX ADMIN — SODIUM CHLORIDE 15 MCG: 900 INJECTION, SOLUTION INTRAVENOUS at 19:11

## 2021-11-11 RX ADMIN — SODIUM CHLORIDE, SODIUM LACTATE, POTASSIUM CHLORIDE, CALCIUM CHLORIDE AND DEXTROSE MONOHYDRATE: 5; 600; 310; 30; 20 INJECTION, SOLUTION INTRAVENOUS at 22:10

## 2021-11-11 RX ADMIN — DOCUSATE SODIUM 50 MG AND SENNOSIDES 8.6 MG 1 TABLET: 8.6; 5 TABLET, FILM COATED ORAL at 21:18

## 2021-11-11 RX ADMIN — SODIUM CITRATE AND CITRIC ACID MONOHYDRATE 30 ML: 500; 334 SOLUTION ORAL at 17:01

## 2021-11-11 RX ADMIN — DEXAMETHASONE SODIUM PHOSPHATE 4 MG: 4 INJECTION, SOLUTION INTRAMUSCULAR; INTRAVENOUS at 18:39

## 2021-11-11 RX ADMIN — DOCUSATE SODIUM 50 MG AND SENNOSIDES 8.6 MG 1 TABLET: 8.6; 5 TABLET, FILM COATED ORAL at 21:13

## 2021-11-11 RX ADMIN — PHENYLEPHRINE HYDROCHLORIDE 0.2 MCG/KG/MIN: 10 INJECTION INTRAVENOUS at 18:05

## 2021-11-11 RX ADMIN — BUPIVACAINE HYDROCHLORIDE 20 ML: 2.5 INJECTION, SOLUTION EPIDURAL; INFILTRATION; INTRACAUDAL; PERINEURAL at 19:05

## 2021-11-11 RX ADMIN — SODIUM CHLORIDE 15 MCG: 900 INJECTION, SOLUTION INTRAVENOUS at 19:05

## 2021-11-11 RX ADMIN — SODIUM CHLORIDE 140 MG: 9 INJECTION, SOLUTION INTRAVENOUS at 16:59

## 2021-11-11 RX ADMIN — ONDANSETRON HYDROCHLORIDE 4 MG: 2 SOLUTION INTRAMUSCULAR; INTRAVENOUS at 18:00

## 2021-11-11 RX ADMIN — KETOROLAC TROMETHAMINE 30 MG: 30 INJECTION, SOLUTION INTRAMUSCULAR; INTRAVENOUS at 23:50

## 2021-11-11 RX ADMIN — LIDOCAINE HYDROCHLORIDE 1 ML: 10 INJECTION, SOLUTION INFILTRATION; PERINEURAL at 18:03

## 2021-11-11 RX ADMIN — SODIUM CHLORIDE, POTASSIUM CHLORIDE, SODIUM LACTATE AND CALCIUM CHLORIDE 1000 ML: 600; 310; 30; 20 INJECTION, SOLUTION INTRAVENOUS at 16:30

## 2021-11-11 RX ADMIN — ACETAMINOPHEN 975 MG: 325 TABLET, FILM COATED ORAL at 21:25

## 2021-11-11 ASSESSMENT — ACTIVITIES OF DAILY LIVING (ADL)
ADLS_ACUITY_SCORE: 3

## 2021-11-11 NOTE — H&P
[facility] Labor and Delivery Admission H&P    Lianne Martinez MRN# [id number]   Age: 31 year old YOB: 1990     Date of Admission:  2021    Primary care provider: Elizabeth Walker           Chief Complaint:   Lianne Martinez is a 31 year old  at 38w4d admitted for RCS in the setting of ICP.          Pregnancy history:   This pregnancy has been complicated by ICP, velamentous cord and history of previous . She notes she has had itchy palms and soles for the past week- bile acids were collected and are pending. We discussed  That as she is >36 weeks, with the presumed diagnosis of ICP, we should proceed with delivery. She was originally scheduled for RCS on 11/15.     She endorses good fetal movements, no LOF or VB.      OBSTETRIC HISTORY:    OB History    Para Term  AB Living   2 1 1 0 0 1   SAB IAB Ectopic Multiple Live Births   0 0 0 0 1      # Outcome Date GA Lbr Sukhi/2nd Weight Sex Delivery Anes PTL Lv   2 Current            1 Term 10/03/15    M CS-Unspec   COURTNEY      Name: Panda       PRENATAL LABS:   Lab Results   Component Value Date    ABO B 2021    RH Neg 2021    AS Neg 2021    HEPBANG Nonreactive 2021    HGB 12.0 2021         MEDICATIONS:  Facility-Administered Medications Prior to Admission   Medication Dose Route Frequency Provider Last Rate Last Admin     rho(D) immune globulin (RHOGAM) injection 300 mcg  300 mcg Intramuscular Once Warren Escobar MD         Medications Prior to Admission   Medication Sig Dispense Refill Last Dose     Multiple Vitamins-Minerals (MULTIVITAMIN ADULT PO) Take 1 tablet by mouth daily      .        Maternal Past Medical History:     Past Medical History:   Diagnosis Date     Family history of genetic disease     mother  in her 30s of primary biliary cirrhosis. LFTs 18 WNL      IBS (irritable bowel syndrome)      Vulvodynia 2018     She specifically denies asthma, HTN, DM or  history of VTE    SURGICAL HISTORY:  Past Surgical History:   Procedure Laterality Date     C/SECTION, LOW TRANSVERSE  10/03/2015              ALLERGIES:  No known medication allergies      Family History:     Family History     Problem (# of Occurrences) Relation (Name,Age of Onset)    Cirrhosis (2) Mother, Maternal Aunt    Liver Disease (2) Mother, Maternal Aunt        Family History   Problem Relation Age of Onset     Cirrhosis Mother      Liver Disease Mother      Liver Disease Maternal Aunt      Cirrhosis Maternal Aunt                Social History:     Social History     Tobacco Use     Smoking status: Never Smoker     Smokeless tobacco: Never Used   Vaping Use     Vaping Use: Never used   Substance Use Topics     Alcohol use: Never     Drug use: Never              Review of Systems:   ROS:   Skin: negative for rash, bruising  Eyes: negative for visual blurring, double vision  Ears/Nose/Throat: negative for nasal congestion, vertigo  Respiratory: No shortness of breath, dyspnea on exertion, cough, or hemoptysis  Cardiovascular: negative for palpitations, chest pain, lower extremity edema and syncope or near-syncope  Gastrointestinal: negative for, nausea, vomiting and hematemesis  Genitourinary: negative for, dysuria, frequency and urgency  Musculoskeletal: negative for, back pain and muscular weakness  Neurologic: negative for, headaches, syncope, seizures and local weakness  Psychiatric: negative for, anxiety, depression and hallucinations  Hematologic/Lymphatic/Immunologic: negative for, anemia, chills and fever             Physical Exam:     Patient Vitals for the past 8 hrs:   BP Temp Temp src Resp   11/11/21 1500 124/76 97.9  F (36.6  C) Oral 16     Gen: Alert and oriented, No acute distress, well-appearing  CV: Normal heart rate to mild tachycardia with labor, regular rhythm, no audible murmur or gallop  Resp: Lungs clear to auscultation, non-labored respirations  Abd: Soft, gravid, intermittent  contractions palpated, no point tenderness  Ext: No sign of asymmetric edema, erythema, or asymmetric size. No pain with movement, Negative Danni's sign    Cervix: deferred  Membranes: intact  EFW by Ledeanads: 3000    FHT: Cat 1  Buchanan: quiet        Assessment:   Lianne Martinez is a 31 year old  at 38w4d admitted for delivery via RCS due to ICP, doing well. This pregnancy is complicated by velamentous cord, ICP, previous .        Plan:     # Term IUP: Labor progress  -- SVE: deferred  -- Buchanan: quiet   -- Membranes: intact    # FWB  -- CEFM: Cat 1  -- EFW:  gms by leopolds    # Routine Prenatal Care  -- Dating: LMP=7 week US OSCAR: 2021  -- PNLs:               B NEG, AB screen neg              RPR nr              Hep B S Ag neg              Rubella Immune              HIV neg in 2021                         Pap: NIL, HPV negative              GC/Chlam neg     -- Genetic Screening: Declined  -- Anatomy US: : Questionable left choroid cyst, follow up MFM scan ordered- resolved/not present  -- Immunizations: Tdap , Rhogam   -- 3rd TM labs including CBC, RPR: Hgb 12.1, RPR nr  -- 1 hr GTT: 118  -- GBS: Negative  -- Postpartum Planning: Breastfeeding    # ICP  -- Bile acids pending  -- Delivery as she is >36 weeks with persistent itchy palms and soles    # Previous CS due to breech  -- Plans for repeat at 39 weeks:11/15 7:30 am     # B NEG blood type  -- Rhogam given   -- s/p rhogam on  due to vaginal spotting     # Velamentous cord insertion  -- Growth scans q 3-4 weeks                16%Ile: AC 24%ile, BPD 58%ile, HC 31%ile, FL 9%ile               : 23%ile: AC 31%ile, BPD 67%ile, HC 38%ile, FL 10%ile               10/13: 29%ile, AC 37%ile, BPD 65%ile, HC 39%ile, FL 11%ile               : 24%ile, AC 24%ile    # PMH/PSH  -- IBS  -- Previous CS    # Obstetric history  --   CSx1    # PP Planning  -- Plans to breastfeed     # Plan  -- Admit to labor and delivery  --  proceed with planned RCS    Ms. Martinez is consented for cesaren delivery, blood products. Risks of surgery were discussed including but not limited to bleeding, infection, and injury to near by structures including but not limited to bowel, bladder, baby, blood vessels, nerves, ureters, and fallopian tubes.  She was consented for blood products and risks of blood transfusion discussed including but not limited to infection, reaction, and fever. She demonstrated understanding and all questions were answered. Consent forms signed for C/S and blood products.        KOREY SAINZ MD on 11/11/2021 at 5:39 PM

## 2021-11-11 NOTE — TELEPHONE ENCOUNTER
Call to patient and discussed that Csection would be at 5pm today with 3pm arrival time. She was in agreement with this plan.     Jody Jim RN on 11/11/2021 at 9:06 AM

## 2021-11-11 NOTE — ANESTHESIA PREPROCEDURE EVALUATION
Anesthesia Pre-Procedure Evaluation    Patient: Lianne Martinez   MRN: [id number] : 1990        Preoperative Diagnosis: History of  section [Z98.891]    Procedure : Procedure(s):  REPEAT  SECTION          Past Medical History:   Diagnosis Date     Family history of genetic disease     mother  in her 30s of primary biliary cirrhosis. LFTs 18 WNL      IBS (irritable bowel syndrome)      Vulvodynia 2018      Past Surgical History:   Procedure Laterality Date     C/SECTION, LOW TRANSVERSE  10/03/2015      Allergies   Allergen Reactions     Penicillins      Throat itching, rash      Social History     Tobacco Use     Smoking status: Never Smoker     Smokeless tobacco: Never Used   Substance Use Topics     Alcohol use: Never      Wt Readings from Last 1 Encounters:   21 81.6 kg (180 lb)        Anesthesia Evaluation   Pt has had prior anesthetic. Type: Regional.        ROS/MED HX  ENT/Pulmonary:  - neg pulmonary ROS     Neurologic:       Cardiovascular:  - neg cardiovascular ROS     METS/Exercise Tolerance: >4 METS    Hematologic:  - neg hematologic  ROS     Musculoskeletal:  - neg musculoskeletal ROS     GI/Hepatic: Comment: Nausea today      Renal/Genitourinary:  - neg Renal ROS     Endo:       Psychiatric/Substance Use:  - neg psychiatric ROS     Infectious Disease:  - neg infectious disease ROS     Malignancy:  - neg malignancy ROS     Other:      (+) , previous ,         Physical Exam    Airway        Mallampati: I   TM distance: > 3 FB   Neck ROM: full   Mouth opening: > 3 cm    Respiratory Devices and Support         Dental  no notable dental history         Cardiovascular   cardiovascular exam normal          Pulmonary   pulmonary exam normal                OUTSIDE LABS:  CBC:   Lab Results   Component Value Date    WBC 11.1 (H) 2021    WBC 9.7 2021    HGB 12.0 2021    HGB 12.1 2021    HCT 34.9 (L) 2021    HCT 41.3 2021    PLT  242 08/18/2021     04/06/2021     BMP:   Lab Results   Component Value Date     02/16/2021    POTASSIUM 4.3 02/16/2021    CHLORIDE 109 02/16/2021    CO2 26 02/16/2021    BUN 9 02/16/2021    CR 0.61 02/16/2021    GLC 88 02/16/2021     COAGS: No results found for: PTT, INR, FIBR  POC: No results found for: BGM, HCG, HCGS  HEPATIC:   Lab Results   Component Value Date    ALBUMIN 3.3 (L) 11/10/2021    PROTTOTAL 5.9 (L) 11/10/2021    ALT 32 11/10/2021    AST 30 11/10/2021    ALKPHOS 912 (H) 11/10/2021    BILITOTAL 0.4 11/10/2021     OTHER:   Lab Results   Component Value Date    CAM 9.2 02/16/2021    TSH 1.58 02/16/2021       Anesthesia Plan    ASA Status:  2   NPO Status:  NPO Appropriate    Anesthesia Type: Spinal.              Consents    Anesthesia Plan(s) and associated risks, benefits, and realistic alternatives discussed. Questions answered and patient/representative(s) expressed understanding.     - Discussed with:  Patient      - Extended Intubation/Ventilatory Support Discussed: No.      - Patient is DNR/DNI Status: No    Use of blood products discussed: Yes.     - Discussed with: Patient.     Postoperative Care    Pain management: IV analgesics, Peripheral nerve block (Single Shot), Multi-modal analgesia (TAP block).   PONV prophylaxis: Ondansetron (or other 5HT-3)     Comments:                WILFREDO VILLASENOR CRNA

## 2021-11-11 NOTE — PROGRESS NOTES
31 year old  at 38 4/7 weeks gestation to [facility] room 405 from home for a repeat C/Section. EFM/EUM applied FHT's Category 1, uterine irritability noted. IV infusing, surgical prep completed, consents signed.

## 2021-11-12 LAB
ABO/RH(D): NORMAL
ALBUMIN SERPL-MCNC: 3 G/DL (ref 3.5–5.7)
ALP SERPL-CCNC: 801 U/L (ref 34–104)
ALT SERPL W P-5'-P-CCNC: 29 U/L (ref 7–52)
ANION GAP SERPL CALCULATED.3IONS-SCNC: 9 MMOL/L (ref 3–14)
AST SERPL W P-5'-P-CCNC: 32 U/L (ref 13–39)
BILIRUB SERPL-MCNC: 0.3 MG/DL (ref 0.3–1)
BUN SERPL-MCNC: 9 MG/DL (ref 7–25)
CALCIUM SERPL-MCNC: 8.5 MG/DL (ref 8.6–10.3)
CHLORIDE BLD-SCNC: 106 MMOL/L (ref 98–107)
CO2 SERPL-SCNC: 20 MMOL/L (ref 21–31)
CREAT SERPL-MCNC: 0.61 MG/DL (ref 0.6–1.2)
FBST KIT EXP: NORMAL
FBST LOT #: NORMAL
FETAL BLEED SCREEN: NORMAL
GFR SERPL CREATININE-BSD FRML MDRD: >90 ML/MIN/1.73M2
GLUCOSE BLD-MCNC: 102 MG/DL (ref 70–105)
HGB BLD-MCNC: 10 G/DL (ref 11.7–15.7)
POTASSIUM BLD-SCNC: 4.6 MMOL/L (ref 3.5–5.1)
PROT SERPL-MCNC: 5.3 G/DL (ref 6.4–8.9)
SODIUM SERPL-SCNC: 135 MMOL/L (ref 134–144)
SPECIMEN EXPIRATION DATE: NORMAL
T PALLIDUM AB SER QL: NONREACTIVE

## 2021-11-12 PROCEDURE — 36415 COLL VENOUS BLD VENIPUNCTURE: CPT | Performed by: STUDENT IN AN ORGANIZED HEALTH CARE EDUCATION/TRAINING PROGRAM

## 2021-11-12 PROCEDURE — 120N000001 HC R&B MED SURG/OB

## 2021-11-12 PROCEDURE — 3E0234Z INTRODUCTION OF SERUM, TOXOID AND VACCINE INTO MUSCLE, PERCUTANEOUS APPROACH: ICD-10-PCS | Performed by: STUDENT IN AN ORGANIZED HEALTH CARE EDUCATION/TRAINING PROGRAM

## 2021-11-12 PROCEDURE — 85461 HEMOGLOBIN FETAL: CPT | Performed by: STUDENT IN AN ORGANIZED HEALTH CARE EDUCATION/TRAINING PROGRAM

## 2021-11-12 PROCEDURE — 250N000013 HC RX MED GY IP 250 OP 250 PS 637: Performed by: STUDENT IN AN ORGANIZED HEALTH CARE EDUCATION/TRAINING PROGRAM

## 2021-11-12 PROCEDURE — 85018 HEMOGLOBIN: CPT | Performed by: STUDENT IN AN ORGANIZED HEALTH CARE EDUCATION/TRAINING PROGRAM

## 2021-11-12 PROCEDURE — 250N000011 HC RX IP 250 OP 636: Performed by: STUDENT IN AN ORGANIZED HEALTH CARE EDUCATION/TRAINING PROGRAM

## 2021-11-12 PROCEDURE — 82040 ASSAY OF SERUM ALBUMIN: CPT | Performed by: STUDENT IN AN ORGANIZED HEALTH CARE EDUCATION/TRAINING PROGRAM

## 2021-11-12 PROCEDURE — 999N000157 HC STATISTIC RCP TIME EA 10 MIN

## 2021-11-12 RX ADMIN — OXYCODONE HYDROCHLORIDE 5 MG: 5 TABLET ORAL at 03:24

## 2021-11-12 RX ADMIN — ACETAMINOPHEN 975 MG: 325 TABLET, FILM COATED ORAL at 14:55

## 2021-11-12 RX ADMIN — ACETAMINOPHEN 975 MG: 325 TABLET, FILM COATED ORAL at 02:36

## 2021-11-12 RX ADMIN — ACETAMINOPHEN 975 MG: 325 TABLET, FILM COATED ORAL at 09:18

## 2021-11-12 RX ADMIN — SIMETHICONE 80 MG: 80 TABLET, CHEWABLE ORAL at 09:18

## 2021-11-12 RX ADMIN — ACETAMINOPHEN 975 MG: 325 TABLET, FILM COATED ORAL at 21:28

## 2021-11-12 RX ADMIN — KETOROLAC TROMETHAMINE 30 MG: 30 INJECTION, SOLUTION INTRAMUSCULAR; INTRAVENOUS at 06:11

## 2021-11-12 RX ADMIN — OXYCODONE HYDROCHLORIDE 5 MG: 5 TABLET ORAL at 09:22

## 2021-11-12 RX ADMIN — SIMETHICONE 80 MG: 80 TABLET, CHEWABLE ORAL at 18:15

## 2021-11-12 RX ADMIN — DOCUSATE SODIUM 50 MG AND SENNOSIDES 8.6 MG 2 TABLET: 8.6; 5 TABLET, FILM COATED ORAL at 20:25

## 2021-11-12 RX ADMIN — KETOROLAC TROMETHAMINE 30 MG: 30 INJECTION, SOLUTION INTRAMUSCULAR; INTRAVENOUS at 12:17

## 2021-11-12 RX ADMIN — OXYCODONE HYDROCHLORIDE 5 MG: 5 TABLET ORAL at 20:24

## 2021-11-12 RX ADMIN — HUMAN RHO(D) IMMUNE GLOBULIN 300 MCG: 1500 SOLUTION INTRAMUSCULAR; INTRAVENOUS at 19:55

## 2021-11-12 RX ADMIN — SIMETHICONE 80 MG: 80 TABLET, CHEWABLE ORAL at 02:37

## 2021-11-12 RX ADMIN — DOCUSATE SODIUM 50 MG AND SENNOSIDES 8.6 MG 1 TABLET: 8.6; 5 TABLET, FILM COATED ORAL at 09:18

## 2021-11-12 RX ADMIN — SIMETHICONE 80 MG: 80 TABLET, CHEWABLE ORAL at 12:30

## 2021-11-12 RX ADMIN — IBUPROFEN 800 MG: 400 TABLET ORAL at 18:11

## 2021-11-12 NOTE — PROGRESS NOTES
" Postpartum Rounding Progress Note    Ms. Martinez is PPD #1 today after a  section. This morning she reports feeling well with no acute concerns at this time. She has been meeting appropriate postpartum milestones including: minimal lochia without clots, up and ambulating, voiding spontaneously, normal appetite without nausea, +flatus no BM yet.  Pain has been well controlled with tylenol, ibuprofen. No concerns for leg pain or calf pain.  She reports her mood is good. We discussed what to expect as she recovers at home including mild incisional pain, continued, scant lochia, mood fluctuations and uterine cramping, especially with breastfeeding.       Exam  Vitals:  BP Readings from Last 1 Encounters:   21 117/67     Pulse Readings from Last 1 Encounters:   21 77     Wt Readings from Last 1 Encounters:   21 81.6 kg (180 lb)     Ht Readings from Last 1 Encounters:   21 1.626 m (5' 4\")     Estimated body mass index is 30.9 kg/m  as calculated from the following:    Height as of 21: 1.626 m (5' 4\").    Weight as of 21: 81.6 kg (180 lb).  Temp Readings from Last 1 Encounters:   21 97.2  F (36.2  C) (Temporal)       General: No acute distress, well-appearing  CV: Normal rate, no pallor  Lungs: Non-labored respirations  Abdominal: Uterine fundus is firm, midline and just below umbilicus. Pfannenstiel incision covered with bandage, underneath skin glue and steri-strips on the edges.  Extremities: Normal movement, mild, symmetric bilateral lower extremity swelling, no sign of erythema or asymmetry. Negative Danni's bilaterally    Assessment & Plan  Ms. Martinez is a 31 y.o.  s/p  section at 38w4d following pregnancy complicated by ICP, RH negative status, velamentous cord insertion.    #POD 1  --Meeting milestones appropriately  --Lochia bleeding w/o clots  --Hgb 12.0 pre-op-->10.0 post-op. Iron started for asymptomatic anemia   --Pain adequately " controlled  --Tolerating regular diet and ambulating well    # ICP  -- Liver enzyme trending with labs pending this AM    #IWB  --Nilda at bedside, doing well per mom  --Breastfeeding    #PNL  --Rh negative: Rhogam workup ordered  -- Rubella immune, GBS neg    #Disposition  --Continue routine postpartum care  --Anticipate discharge on POD 2-3  --Follow up in clinic in 2 weeks after discharge    KOREY SAINZ MD on 11/12/2021 at 7:28 AM

## 2021-11-12 NOTE — ANESTHESIA POSTPROCEDURE EVALUATION
Patient: Lianne Martinez    Procedure: Procedure(s):  REPEAT  SECTION       Diagnosis:History of  section [Z98.891]  Diagnosis Additional Information: No value filed.    Anesthesia Type:  Spinal    Note:  Disposition: Inpatient   Postop Pain Control: Uneventful            Sign Out: Well controlled pain   PONV: No   Neuro/Psych: Uneventful            Sign Out: Acceptable/Baseline neuro status   Airway/Respiratory: Uneventful            Sign Out: Acceptable/Baseline resp. status   CV/Hemodynamics: Uneventful            Sign Out: Acceptable CV status; No obvious hypovolemia; No obvious fluid overload   Other NRE: NONE   DID A NON-ROUTINE EVENT OCCUR? No           Last vitals:  Vitals Value Taken Time   /65 21   Temp 97.4  F (36.3  C) 21   Pulse 80 21   Resp 30 21   SpO2 97 % 21       Electronically Signed By: WILFREDO VILLASENOR CRNA  2021  7:35 PM

## 2021-11-12 NOTE — OR NURSING
PACU Transfer of Care Note    Lianne Martinez's care was transferred to [facility].  Equipment used for transport:  none.  Accompanied by:  NA  Prescriptions were: NA    PACU Respiratory Event Documentation     1) Episodes of Apnea greater than or equal to 10 seconds: no    2) Bradypnea - less than 8 breaths per minute: 16    3) Pain score on 0 to 10 scale: 0    4) Pain-sedation mismatch (yes or no): no    5) Repeated 02 desaturation less than 90% (yes or no): no    Anesthesia notified? (yes or no): yes    Any of the above events occuring repeatedly in separate 30 minute intervals may be considered recurrent PACU respiratory events.    Patient stable and meets phase 1 discharge criteria for transfer of care from PACU.  Report given to Kaylee at bedside.  Faith Egan RN on 11/11/2021 at 7:58 PM

## 2021-11-12 NOTE — PROGRESS NOTES
[facility]    Obstetrics Post-Op / Progress Note    Assessment & Plan   Assessment:  -1 Day Post-Op  Procedure(s):  REPEAT  SECTION    Doing well.  Clean wound without signs of infection.  No immediate surgical complications identified.  No excessive bleeding  Pain well-controlled.    Plan:  Ambulation encouraged  Monitor wound for signs of infection  Pain control measures as needed  Discharge later today    Warren Escobar     Interval History   Doing well.  Pain is well-controlled.  No fevers.  No history of wound drainage, warmth or significant erythema.  Good appetite.  Denies chest pain, shortness of breath, nausea or vomiting.  Ambulatory.  Breastfeeding well.    Medications     dextrose 5% lactated ringers 125 mL/hr at 21 2210     - MEDICATION INSTRUCTIONS -       oxytocin in 0.9% NaCl         acetaminophen  975 mg Oral Q6H     ibuprofen  800 mg Oral Q6H     ketorolac  30 mg Intravenous Q6H     rho(D) immune globulin  300 mcg Intravenous Once    Or     rho(D) immune globulin  300 mcg Intramuscular Once     senna-docusate  1 tablet Oral BID    Or     senna-docusate  2 tablet Oral BID     sodium chloride (PF)  3 mL Intracatheter Q8H     Tdap (tetanus-diptheria-acell pertussis)  0.5 mL Intramuscular Once       Physical Exam   Temp: 97.2  F (36.2  C) Temp src: Temporal BP: 117/67 Pulse: 77   Resp: 18 SpO2: 100 % O2 Device: None (Room air)    There were no vitals filed for this visit.  Vital Signs with Ranges  Temp:  [97.1  F (36.2  C)-97.9  F (36.6  C)] 97.2  F (36.2  C)  Pulse:  [71-81] 77  Resp:  [9-35] 18  BP: ()/(52-76) 117/67  SpO2:  [91 %-100 %] 100 %  I/O last 3 completed shifts:  In: 3328 [P.O.:1500; I.V.:1828]  Out: 2108 [Urine:1225; Blood:883]    Uterine fundus is firm, non-tender and at the level of the umbilicus  Incision C/D/I  Extremities Non-tender    Data   Recent Labs   Lab Test 21  1451 21  1207   ABO  --  B   RH  --  Neg   AS Positive* Neg      Recent Labs   Lab Test 11/12/21  0653 11/11/21  1451   HGB 10.0* 12.0     Recent Labs   Lab Test 04/06/21  1525   RUQIGG 12

## 2021-11-12 NOTE — PROGRESS NOTES
Patient has been out of bed entire day, sitting up in rocking chair. Patient denies abdominal incision pain, but has right sided abdominal pain which has been relieved with ambulation, position changes, abdominal binder, and oral medications, please see MAR for detailed information. Please see flow sheet for additional information. Passing flatus, no BM yet and taking stool softeners. Cares well for self and  with  [place] assistance. Breast feeding going very well, have witness good positioning and latch on.

## 2021-11-12 NOTE — ANESTHESIA CARE TRANSFER NOTE
Patient: Lianne Martinez    Procedure: Procedure(s):  REPEAT  SECTION       Diagnosis: History of  section [Z98.891]  Diagnosis Additional Information: No value filed.    Anesthesia Type:   Spinal     Note:      Level of Consciousness: awake  Oxygen Supplementation: room air    Independent Airway: airway patency satisfactory and stable    Vital Signs Stable: post-procedure vital signs reviewed and stable  Report to RN Given: handoff report given  Patient transferred to: Labor and Delivery    Handoff Report: Identifed the Patient, Identified the Reponsible Provider, Reviewed the pertinent medical history, Discussed the surgical course, Reviewed Intra-OP anesthesia mangement and issues during anesthesia, Set expectations for post-procedure period and Allowed opportunity for questions and acknowledgement of understanding      Vitals:  Vitals Value Taken Time   /66 215   Temp     Pulse 72 21   Resp 20 21   SpO2 98 % 21   Vitals shown include unvalidated device data.    Electronically Signed By: WILFREDO VILLASENOR CRNA  2021  7:28 PM

## 2021-11-12 NOTE — OP NOTE
OB  Delivery Note     Date of procedure: 2021  Indication for procedure: Repeat , ICP  Type of procedure: [facility]  Surgeon: Freida Williamson MD  Anesthesia: Spinal  IVF:  900 mL  EBL:  600 mL  UOP:  100 mL  Pre-Procedure Diagnosis:       1. Intrahepatic cholestasis of pregnancy      2. History of prior  section      3. Rh negative status in antepartum period      4. Velamentous cord insertion  Post-Procedure Diagnosis: Same  Findings: Normal appearing gravid uterus, normal appearing bilateral fallopian tubes and ovaries  Complications: None     Technique:  Ms. Martinez is a 31 year old  who presented to the hospital for a planned repeat  section in the setting of ICP.     I met with Lianne in the preoperative area to discuss the procedure in detail. We discussed the risks, benefits, alternatives and indications for the  to be performed and potential need for a blood transfusion. These risks include but are not limited to bleeding, infection, and injury to near by structures (including but not limited to bowel, bladder, baby, blood vessels, nerves, ureters, and fallopian tubes). Blood transfusion risks are small but can include infection, reaction, and fever. After discussion, proper consent forms were signed and placed in her chart. Lianne and Chaim were given the opportunity to ask questions which were answered to her satisfaction. She was then escorted back to the operating room. Upon arrival to the OR, she was assisted onto the operating table without difficulty where the anesthesia was administered. A arora catheter was placed. Fetal well-being was verified with reassuring heart tones. She was fastened to the OR table with a security belt applied to her upper thigh before rotating into the left lateral tilt position. Stocking compression devices were applied and confirmed to be working appropriately. The Bovie grounding pad was applied to her lateral leg. The  abdomen was cleaned across the area of planned operation. The operating physician proceeded to scrub according to sterile technique prior to donning sterile gown and gloves.      An formal operative time-out was performed confirming the patient, procedure, medications, allergies and surgical site. All of the operating room attendees were in agreement. Adequate analgesia was confirmed with use of an Allis clamp. An incision was made approximately two finger-breadths above the pubic symphysis and the incision was extended approximately 6 cm laterally from the linea at midline. The subcutaneous tissue was dissected down with a combination of sharp and blunt dissection. Superficial bleeding was made hemostatic with the Bovie. The rectus fascia was incised across the midline and dissected with sharp dissection. The peritoneal cavity was entered bluntly along the midline at the rectus diastasis. With blunt pressure this incision was extended, an Kendrick retractor was placed and the contents of the peritoneum were visualized clearly.     The hysterotomy was created in a U-shaped manner at the level of the lower uterine segment with care to ensure the bladder was tucked out of the way. The incision was extended with application of bilateral blunt force. The amniotic membrane was ruptured to show clear fluid. A single hand was inserted into the pelvis and aided in the delivery of the infant. The cephalic presenting part delivered without difficulty followed by the anterior shoulder and posterior shoulder. A loose nuchal cord was noted at the time of delivery and easily reduced. Uncomplicated delivery of a vigorous baby girl, Nilda, was noted thereafter. The umbilical cord was clamped and cut. The  was passed off to the awaiting nursing team.      Fundal massage and gentle downward traction on the umbilical cord aided in the delivery of the intact placenta. The uterus was exteriorized and the uterine cavity was wiped  clean with a sterile, wet lap. The cavity was found to be clear of any remaining placental parts or membranes. The hysterotomy incision was closed with an 0-vicryl stitch in a running manner, with each stitch locking the next. A second imbricating layer was placed with an 0-monocryl suture. Adequate hemostasis was noted of the hysterotomy closure.     The uterus was then returned to its anatomic position. The muscle was inspected and found to be hemostatic. Sterile irrigation was performed of the pericolic gutters. The peritoneum was closed with 0-plain. The fascial layer was closed with a 0-vicryl suture in a running fashion. The subcutaneous tissue was noted to be easily reapproximated with a running 3-0 vicryl. The skin was closed with 4-0 vicryl. The incision was cleaned and steri-strips applied. A larger primapore bandage was applied on top.      A sterile vaginal exam was performed to ensure a patent cervix and fundal massage was applied to evacuate the uterus of any remaining clots. Ms. Martinez was then transitioned back into the birthing bed and transferred to the PACU. She tolerated the procedure well and was in stable condition.      KOREY SAINZ MD on 11/11/2021 at 7:25 PM

## 2021-11-12 NOTE — ANESTHESIA PROCEDURE NOTES
TAP Procedure Note    Pre-Procedure   Staff -        CRNA: Janna Ballesteros APRN CRNA       Performed By: CRNA       Location: OR       Procedure Start/Stop Times: 11/11/2021 7:26 PM and 11/11/2021 7:26 PM       Pre-Anesthestic Checklist: patient identified, IV checked, site marked, risks and benefits discussed, informed consent, monitors and equipment checked, pre-op evaluation, at physician/surgeon's request and post-op pain management  Timeout:       Correct Patient: Yes        Correct Procedure: Yes        Correct Site: Yes        Correct Position: Yes        Correct Laterality: Yes        Site Marked: Yes  Procedure Documentation  Procedure: TAP       Diagnosis: C SECTION       Laterality: bilateral       Patient Position: supine       Patient Prep/Sterile Barriers: sterile gloves, mask       Skin prep: Chloraprep       Needle Type: insulated       Needle Gauge: 22.        Needle Length (Inches): 6        Ultrasound guided       1. Ultrasound was used to identify targeted nerve, plexus, vascular marker, or fascial plane and place a needle adjacent to it in real-time.       2. Ultrasound was used to visualize the spread of anesthetic in close proximity to the above referenced structure.       3. A permanent image is entered into the patient's record.       4. The visualized anatomic structures appeared normal.       5. There were no apparent abnormal pathologic findings.    Assessment/Narrative         The placement was negative for: blood aspirated, painful injection and site bleeding       Paresthesias: No.      Bolus given via needle. No blood aspirated via catheter.        Secured via.        Insertion/Infusion Method: Single Shot       Complications: none       Injection made incrementally with aspirations every 5 mL.    Medication(s) Administered   Medication Administration Time: 11/11/2021 7:05 PM     Comments:  20 ml .25% bupivicaine with 4mg decadron and 15mcg precedex added to each side of tap  block

## 2021-11-12 NOTE — PROGRESS NOTES
No initial delivery QBL documented per OR staff. No report received.     Kuldip Pza RN 11/11/21 10:11 PM    Postpartum QBL at 2 hour nia 57 mLs. Minimal bloody discharge. No clots expressed. Peripad changed.    Kuldip Paz RN 11/11/21 10:14 PM

## 2021-11-12 NOTE — ANESTHESIA PROCEDURE NOTES
Lumbar puncture Procedure Note    Pre-Procedure   Staff -        CRNA: Janna Ballesteros APRN CRNA       Performed By: CRNA       Location: OR       Procedure Start/Stop Times: 11/11/2021 6:00 PM and 11/11/2021 6:05 PM       Pre-Anesthestic Checklist: patient identified, IV checked, risks and benefits discussed, informed consent, monitors and equipment checked, pre-op evaluation, at physician/surgeon's request and post-op pain management  Timeout:       Correct Patient: Yes        Correct Procedure: Yes        Correct Site: Yes        Correct Position: Yes   Procedure Documentation  Procedure: lumbar puncture       Diagnosis: c section       Patient Position: sitting       Patient Prep/Sterile Barriers: sterile gloves, mask, patient draped       Skin prep: Chloraprep       Insertion Site: L3-4. (midline approach).       Needle Gauge: 25.        Needle Length (Inches): 3.5        Spinal Needle Type: PencanNo introducer used      # of attempts: 1 and  # of redirects:     Assessment/Narrative         Paresthesias: No.       CSF fluid: clear.    Medication(s) Administered   Medication Administration Time: 11/11/2021 6:05 PM

## 2021-11-12 NOTE — PLAN OF CARE
Patient reports that her pain is controlled with alternating scheduled Tylenol and Toradol IV with use of Oxycodone 5mg x 1 orally. Has more pain on her right side of her abdomen near incision.  Also using warm packs and ice packs for pain control. Steward catheter removed 6 hours post-op when patient had sensation in lower extremities.  Ambulated to the bathroom 1 hour after removal and patient voided without difficulty. Breastfeeding  independently and bonding with baby.  Patient up and ambulating in room independently.   present and supportive.

## 2021-11-13 VITALS
SYSTOLIC BLOOD PRESSURE: 112 MMHG | RESPIRATION RATE: 16 BRPM | TEMPERATURE: 97.3 F | DIASTOLIC BLOOD PRESSURE: 61 MMHG | HEART RATE: 72 BPM | OXYGEN SATURATION: 98 %

## 2021-11-13 PROCEDURE — 99207 PR NO CHARGE LOS: CPT | Performed by: OBSTETRICS & GYNECOLOGY

## 2021-11-13 PROCEDURE — 250N000013 HC RX MED GY IP 250 OP 250 PS 637: Performed by: STUDENT IN AN ORGANIZED HEALTH CARE EDUCATION/TRAINING PROGRAM

## 2021-11-13 RX ORDER — IBUPROFEN 600 MG/1
600 TABLET, FILM COATED ORAL EVERY 6 HOURS PRN
Qty: 30 TABLET | Refills: 0 | Status: SHIPPED | OUTPATIENT
Start: 2021-11-13 | End: 2021-12-23

## 2021-11-13 RX ORDER — OXYCODONE HYDROCHLORIDE 5 MG/1
5 TABLET ORAL EVERY 4 HOURS PRN
Qty: 20 TABLET | Refills: 0 | Status: SHIPPED | OUTPATIENT
Start: 2021-11-13 | End: 2021-12-23

## 2021-11-13 RX ADMIN — OXYCODONE HYDROCHLORIDE 5 MG: 5 TABLET ORAL at 08:57

## 2021-11-13 RX ADMIN — DOCUSATE SODIUM 50 MG AND SENNOSIDES 8.6 MG 1 TABLET: 8.6; 5 TABLET, FILM COATED ORAL at 08:58

## 2021-11-13 RX ADMIN — ACETAMINOPHEN 975 MG: 325 TABLET, FILM COATED ORAL at 03:28

## 2021-11-13 RX ADMIN — IBUPROFEN 800 MG: 400 TABLET ORAL at 00:02

## 2021-11-13 RX ADMIN — SIMETHICONE 80 MG: 80 TABLET, CHEWABLE ORAL at 08:58

## 2021-11-13 RX ADMIN — IBUPROFEN 800 MG: 400 TABLET ORAL at 06:34

## 2021-11-13 RX ADMIN — ACETAMINOPHEN 975 MG: 325 TABLET, FILM COATED ORAL at 08:56

## 2021-11-13 NOTE — DISCHARGE SUMMARY
[facility]    Discharge Summary  Obstetrics    Date of Admission:  2021  Date of Discharge:  2021  Discharging Provider: Warren Escobar    Discharge Diagnoses   History of  section [Z98.891]    Patient Active Problem List   Diagnosis     Family history of genetic disease     Vulvodynia     Encounter for triage in pregnant patient     History of  section       Procedure/Surgery Information   Procedure: Procedure(s):  REPEAT  SECTION   Surgeon(s): Surgeon(s) and Role:     * Ellie Funez MD - Primary     History of Present Illness   Lianne Martinez is a 31 year old female who presented with repeat  section    Hospital Course   The patient's hospital course was unremarkable.  She recovered as anticipated and experienced no post-operative complications.  On discharge, her pain was well controlled. Vaginal bleeding is similar to peak menstrual flow.  Voiding without difficulty.  Ambulating well and tolerating a normal diet.  No fever or significant wound drainage.  Breastfeeding well.  Infant is stable.  She was discharged on post-partum day #3.    Post-partum hemoglobin:   Hemoglobin   Date Value Ref Range Status   2021 10.0 (L) 11.7 - 15.7 g/dL Final   2021 14.1 11.7 - 15.7 g/dL Final       Warren Escobar MD    Discharge Disposition   Discharged to home   Condition at discharge: Stable    Pending Results   Final pathology results: No pathology submitted  These results will be followed up by Dr. Funez  Unresulted Labs Ordered in the Past 30 Days of this Admission     Date and Time Order Name Status Description    11/10/2021 11:08 AM BILE ACIDS FRACTIONATED AND TOTAL In process           Primary Care Physician   Elizabeth Walker    Consultations This Hospital Stay   HOME CARE POST PARTUM/ IP CONSULT  LACTATION IP CONSULT    Discharge Orders      Activity    Activity as tolerated     Reason for your hospital stay    Maternity care      Follow Up    Follow up with provider in 2 weeks and 6 weeks for post-delivery checks     Breast pump    Breast Pump Documentation:  Manual/Electric Pump: To support adequate breast milk production and nutrition for infant.     I, the undersigned, certify that the above prescribed supplies are medically necessary for this patient and is both reasonable and necessary in reference to accepted standards of medical and necessary in reference to accepted standards of medical practice in the treatment of this patient's condition and is not prescribed as a convenience.     Diet    Resume previous diet     Discharge Medications   Current Discharge Medication List      START taking these medications    Details   ibuprofen (ADVIL/MOTRIN) 600 MG tablet Take 1 tablet (600 mg) by mouth every 6 hours as needed for moderate pain  Qty: 30 tablet, Refills: 0    Associated Diagnoses: History of  section      oxyCODONE (ROXICODONE) 5 MG tablet Take 1 tablet (5 mg) by mouth every 4 hours as needed for breakthrough pain  Qty: 20 tablet, Refills: 0    Associated Diagnoses: History of  section         CONTINUE these medications which have NOT CHANGED    Details   Multiple Vitamins-Minerals (MULTIVITAMIN ADULT PO) Take 1 tablet by mouth daily           Allergies   Allergies   Allergen Reactions     Penicillins      Throat itching, rash

## 2021-11-13 NOTE — DISCHARGE INSTRUCTIONS
Contact physician for questions and or concerns.    Please see mother/baby education book for discharge instructions.    [phone]    [phone]    Return to [facility] for following-headache pain, blurred vision, chest pain, shortness breath, breast pain, increased or change in abdominal pain, difficulty voiding, heavy vaginal bleeding, leg pain.

## 2021-11-13 NOTE — PROGRESS NOTES
Discharged home with baby. Discharged home ambulatory with  Chaim, escorted to vehicle per myself.

## 2021-11-13 NOTE — PROGRESS NOTES
[facility]    Obstetrics Post-Op / Progress Note    Assessment & Plan   Assessment:  -2 Days Post-Op  Procedure(s):  REPEAT  SECTION    Doing well.  Clean wound without signs of infection.  No excessive bleeding  Pain well-controlled.    Plan:  Discharge later today    Warren Escobar     Interval History   Doing well.  Pain is well-controlled.  No fevers.  No history of wound drainage, warmth or significant erythema.  Good appetite.  Denies chest pain, shortness of breath, nausea or vomiting.  Ambulatory.  Breastfeeding well.    Medications     dextrose 5% lactated ringers 125 mL/hr at 210     - MEDICATION INSTRUCTIONS -       oxytocin in 0.9% NaCl         acetaminophen  975 mg Oral Q6H     ibuprofen  800 mg Oral Q6H     senna-docusate  1 tablet Oral BID    Or     senna-docusate  2 tablet Oral BID     sodium chloride (PF)  3 mL Intracatheter Q8H     Tdap (tetanus-diptheria-acell pertussis)  0.5 mL Intramuscular Once       Physical Exam   Temp: 99.1  F (37.3  C) Temp src: Oral BP: 105/53 Pulse: 70   Resp: 16 SpO2: 98 % O2 Device: None (Room air)    There were no vitals filed for this visit.  Vital Signs with Ranges  Temp:  [99.1  F (37.3  C)] 99.1  F (37.3  C)  Pulse:  [70] 70  Resp:  [16] 16  BP: (105-107)/(53-57) 105/53  SpO2:  [98 %] 98 %  No intake/output data recorded.    Uterine fundus is firm, non-tender and at the level of the umbilicus  Incision C/D/I  Extremities Non-tender    Data   Recent Labs   Lab Test 21  1451 21  1207   ABO  --  B   RH  --  Neg   AS Positive* Neg     Recent Labs   Lab Test 21  0653 21  1451   HGB 10.0* 12.0     Recent Labs   Lab Test 21  1525   RUQIGG 12

## 2021-11-14 LAB
BILE AC SERPL-SCNC: 29.3 UMOL/L
CDCAE SERPL-SCNC: 8.7 UMOL/L
CHOLATE SERPL-SCNC: 16.3 UMOL/L
DO-CHOLATE SERPL-SCNC: 4 UMOL/L
URSODEOXYCHOLATE SERPL-SCNC: 0.3 UMOL/L

## 2021-11-15 ENCOUNTER — HOSPITAL ENCOUNTER (OUTPATIENT)
Dept: OBGYN | Facility: OTHER | Age: 31
End: 2021-11-15
Payer: OTHER GOVERNMENT

## 2021-11-15 ENCOUNTER — LACTATION ENCOUNTER (OUTPATIENT)
Age: 31
End: 2021-11-15
Payer: OTHER GOVERNMENT

## 2021-11-15 PROCEDURE — S9443 LACTATION CLASS: HCPCS | Performed by: REGISTERED NURSE

## 2021-11-15 NOTE — LACTATION NOTE
This note was copied from a baby's chart.  Outpatient Lactation Visit    Nilda Martinez  [id number]    Consultation Date: November 15, 2021     Reason for Lactation Referral: Initial Lactation Consult    Baby's : 2021    Baby's Current Age: 4 day old  Baby's Gestational Age: Gestational Age: 38w4d    Primary Care Provider: Bri Odonnell    Presenting Problem (concerns as stated by parent): no concerns    MATERNAL HISTORY   History of Breast Surgery: no  Breast Changes During Pregnancy: no  Breast Feeding History: nursed son for 10 months  Maternal Meds: daily prenatal vitamin  Anesthesia during labor: spinal    MATERNAL ASSESSMENT    Breast Size: average, symmetrical, soft after feeding and filling prior to feeding  Nipple Appearance - Left: slightly cracked, with signs of healing, education on further healing techniques provided  Nipple Appearance - Right: slightly cracked, with signs of healing, education on further healing techniques provided  Nipple Erectility - Left: erect with stimulation  Nipple Erectility - Right: erect with stimulation  Areolas Compressibility: soft  Nipple Size: average  Special Equipment Used: none  Day mother reports milk came in:  Day 3    INFANT ASSESSMENT    Oral Anatomy  Mouth: normal  Palate: normal  Jaw: normal  Tongue: normal  Frenulum: normal   Digital Suck Exam: root    FEEDING   Feeding Time: aggressively for 25 minutes  Position:  cradle  Effort to Latch: awake and alert, latched easily  Duration of Breast Feeding: Right Breast: 0; Left Breast: 25  Results: excellent breast feed    Volume of Intake:    Birth Weight: 6 lb 0.5 oz    Hospital discharge weight: 5 lb 9.8 oz    Today's Weight 5 lb 8 oz    Total Intake: 1.5 oz  Output: 3-4 soil diapers in last 24 hours, 3-4 wet diapers in last 24 hours    LATCH Score:   Latch: 2 - Good Latch  Audible Swallowin - Spontaneous & frequent  Type of Nipple: (Breast/Nipple) 2 - Everted  Comfort: 2 - Soft, Nontender  Hold: 2 - No  Assist   Total LATCH Score:  10    FEEDING PLAN    Home Feeding Plan: Continue to feed on demand when  elicits feeding cues with deep latch.  Babe should be eating 8-12 times in a 24 hour period.  Exclusivity explained and encouraged in the early weeks to establish breastfeeding and order in milk supply.  Rooming-in encouraged with explanation of the benefits.  Continue to apply expressed breast milk and Lanolin cream to nipples after feedings for healing and comfort.  Postpartum breastfeeding assessment completed and education provided.  Items included in the education are:     proper positioning and latch    effectiveness of feeding    manual expression    handling and storing breastmilk    maintenance of breastfeeding for the first 6 months    sign/symptoms of infant feeding issues requiring referral to qualified health care provider    LACTATION COMMENTS   Deep latch explained for proper positioning of breast in infant's mouth, maximizing milk transfer and comfort.  Reassurance and encouragement provided in regard to mom's concerns about milk supply.  Follow-up support information provided.  Parents plan to keep [facility] Well-Child Check with Dr. Odonnell as scheduled for 2 week well child check.      Face-to-face Time: 60 minutes with assessment and education.    Asha Zacarias RN  11/15/2021  10:45 AM

## 2021-11-16 ENCOUNTER — TELEPHONE (OUTPATIENT)
Dept: OBGYN | Facility: OTHER | Age: 31
End: 2021-11-16
Payer: OTHER GOVERNMENT

## 2021-11-16 NOTE — TELEPHONE ENCOUNTER
Returned patients call and discussed her swelling. Pt. States it is bilateral feet/ankles, no complaints of headaches, has not checked her BP. Discussed with Dr. Funez and advised patient to elevate her feet when she can and this will eventually get better and to check her BP and call if elevated. Patient voiced understanding and has no further questions at this time.   Margie Garrison RN on 11/16/2021 at 11:58 AM

## 2021-11-16 NOTE — TELEPHONE ENCOUNTER
Having increased swelling in feet, please call, thanks!    Beverly Major on 11/16/2021 at 10:37 AM

## 2021-11-24 ENCOUNTER — OFFICE VISIT (OUTPATIENT)
Dept: OBGYN | Facility: OTHER | Age: 31
End: 2021-11-24
Attending: STUDENT IN AN ORGANIZED HEALTH CARE EDUCATION/TRAINING PROGRAM
Payer: OTHER GOVERNMENT

## 2021-11-24 VITALS
BODY MASS INDEX: 27.41 KG/M2 | DIASTOLIC BLOOD PRESSURE: 70 MMHG | RESPIRATION RATE: 14 BRPM | SYSTOLIC BLOOD PRESSURE: 112 MMHG | HEART RATE: 72 BPM | WEIGHT: 159.7 LBS

## 2021-11-24 DIAGNOSIS — Z98.891 HISTORY OF CESAREAN SECTION: Primary | ICD-10-CM

## 2021-11-24 DIAGNOSIS — O26.649 INTRAHEPATIC CHOLESTASIS OF PREGNANCY: ICD-10-CM

## 2021-11-24 LAB
ALBUMIN SERPL-MCNC: 4.2 G/DL (ref 3.5–5.7)
ALP SERPL-CCNC: 280 U/L (ref 34–104)
ALT SERPL W P-5'-P-CCNC: 25 U/L (ref 7–52)
ANION GAP SERPL CALCULATED.3IONS-SCNC: 9 MMOL/L (ref 3–14)
AST SERPL W P-5'-P-CCNC: 23 U/L (ref 13–39)
BILIRUB SERPL-MCNC: 0.4 MG/DL (ref 0.3–1)
BUN SERPL-MCNC: 10 MG/DL (ref 7–25)
CALCIUM SERPL-MCNC: 9.7 MG/DL (ref 8.6–10.3)
CHLORIDE BLD-SCNC: 105 MMOL/L (ref 98–107)
CO2 SERPL-SCNC: 26 MMOL/L (ref 21–31)
CREAT SERPL-MCNC: 0.69 MG/DL (ref 0.6–1.2)
GFR SERPL CREATININE-BSD FRML MDRD: >90 ML/MIN/1.73M2
GLUCOSE BLD-MCNC: 81 MG/DL (ref 70–105)
POTASSIUM BLD-SCNC: 4.5 MMOL/L (ref 3.5–5.1)
PROT SERPL-MCNC: 6.3 G/DL (ref 6.4–8.9)
SODIUM SERPL-SCNC: 140 MMOL/L (ref 134–144)

## 2021-11-24 PROCEDURE — 82239 BILE ACIDS TOTAL: CPT | Mod: ZL | Performed by: STUDENT IN AN ORGANIZED HEALTH CARE EDUCATION/TRAINING PROGRAM

## 2021-11-24 PROCEDURE — 36415 COLL VENOUS BLD VENIPUNCTURE: CPT | Mod: ZL | Performed by: STUDENT IN AN ORGANIZED HEALTH CARE EDUCATION/TRAINING PROGRAM

## 2021-11-24 PROCEDURE — 82040 ASSAY OF SERUM ALBUMIN: CPT | Mod: ZL | Performed by: STUDENT IN AN ORGANIZED HEALTH CARE EDUCATION/TRAINING PROGRAM

## 2021-11-24 PROCEDURE — 99024 POSTOP FOLLOW-UP VISIT: CPT | Performed by: STUDENT IN AN ORGANIZED HEALTH CARE EDUCATION/TRAINING PROGRAM

## 2021-11-24 ASSESSMENT — PAIN SCALES - GENERAL: PAINLEVEL: MILD PAIN (3)

## 2021-11-24 NOTE — PROGRESS NOTES
"Chief Complaint   Patient presents with     Postpartum Care     post op 2 weeks     Patient has no concerns on this date. Breastfeeding going well. Bleeding scant. Incisional pain reporting 3/10 at this time.   Initial /70 (BP Location: Right arm, Patient Position: Sitting, Cuff Size: Adult Regular)   Pulse 72   Resp 14   Wt 72.4 kg (159 lb 11.2 oz)   LMP 02/14/2021   BMI 27.41 kg/m   Estimated body mass index is 27.41 kg/m  as calculated from the following:    Height as of 4/6/21: 1.626 m (5' 4\").    Weight as of this encounter: 72.4 kg (159 lb 11.2 oz).  Medication Reconciliation: complete    Greta Clinton RN    "

## 2021-11-24 NOTE — PROGRESS NOTES
Postpartum Office Visit    S: Ms. Martinez is a  who is s/p  section on 2021 for ICP. Her delivery was uncomplicated. Her postpartum course in the hospital was also uncomplicated. She is feeling well today. She has no acute concerns. She notes her mood has been good, denies depression or any concern for harm to herself or others. She has been breast feeding the baby.    O: /70 (BP Location: Right arm, Patient Position: Sitting, Cuff Size: Adult Regular)   Pulse 72   Resp 14   Wt 72.4 kg (159 lb 11.2 oz)   LMP 2021   BMI 27.41 kg/m    Gen: Well-appearing, NAD  Abomen: incision is healing well, no sign of erythema, warmth, discharge or bleeding. Soft, non-tender  Pelvic: deferred as she has no acute concerns    Assessment:  Ms. Lianne Martinez is a 31 year old yo now  who is s/p  section on . She is doing well in the postpartum period.    Plan:  # Routine postpartum care  -- Feeling well, no concerns  -- breast feeding  -- Contraception goals: to be discussed at next visit  -- Mood stable and doing well      # History of ICP  -- Repeat CMP and Bile acids collected today to ensure they are downtrending   Family history of primary biliary cirrhosis in her mother. She passed away from this a few years after Lianne was born   Close monitoring of hepatic function to provide reassurance and show downtrending after delivery    Return to clinic in 4 weeks for 6 week postpartum visit or sooner with any questions or concerns    Freida Williamson MD  OB/GYN  2021 12:08 PM

## 2021-11-26 LAB — BILE AC SERPL-SCNC: 2 UMOL/L

## 2021-12-23 ENCOUNTER — PRENATAL OFFICE VISIT (OUTPATIENT)
Dept: OBGYN | Facility: OTHER | Age: 31
End: 2021-12-23
Attending: STUDENT IN AN ORGANIZED HEALTH CARE EDUCATION/TRAINING PROGRAM
Payer: OTHER GOVERNMENT

## 2021-12-23 VITALS
HEART RATE: 68 BPM | DIASTOLIC BLOOD PRESSURE: 68 MMHG | SYSTOLIC BLOOD PRESSURE: 116 MMHG | BODY MASS INDEX: 27.67 KG/M2 | WEIGHT: 161.2 LBS

## 2021-12-23 DIAGNOSIS — O26.649 INTRAHEPATIC CHOLESTASIS OF PREGNANCY: Primary | ICD-10-CM

## 2021-12-23 LAB
ALBUMIN SERPL-MCNC: 4.6 G/DL (ref 3.5–5.7)
ALP SERPL-CCNC: 90 U/L (ref 34–104)
ALT SERPL W P-5'-P-CCNC: 25 U/L (ref 7–52)
ANION GAP SERPL CALCULATED.3IONS-SCNC: 8 MMOL/L (ref 3–14)
AST SERPL W P-5'-P-CCNC: 27 U/L (ref 13–39)
BILIRUB SERPL-MCNC: 0.4 MG/DL (ref 0.3–1)
BUN SERPL-MCNC: 9 MG/DL (ref 7–25)
CALCIUM SERPL-MCNC: 9.7 MG/DL (ref 8.6–10.3)
CHLORIDE BLD-SCNC: 106 MMOL/L (ref 98–107)
CO2 SERPL-SCNC: 28 MMOL/L (ref 21–31)
CREAT SERPL-MCNC: 0.79 MG/DL (ref 0.6–1.2)
GFR SERPL CREATININE-BSD FRML MDRD: >90 ML/MIN/1.73M2
GLUCOSE BLD-MCNC: 78 MG/DL (ref 70–105)
POTASSIUM BLD-SCNC: 4.5 MMOL/L (ref 3.5–5.1)
PROT SERPL-MCNC: 6.5 G/DL (ref 6.4–8.9)
SODIUM SERPL-SCNC: 142 MMOL/L (ref 134–144)

## 2021-12-23 PROCEDURE — 36415 COLL VENOUS BLD VENIPUNCTURE: CPT | Mod: ZL | Performed by: STUDENT IN AN ORGANIZED HEALTH CARE EDUCATION/TRAINING PROGRAM

## 2021-12-23 PROCEDURE — 99207 PR POST-PARTUM 6 WK VISIT - GICH ONLY: CPT | Performed by: STUDENT IN AN ORGANIZED HEALTH CARE EDUCATION/TRAINING PROGRAM

## 2021-12-23 PROCEDURE — 80053 COMPREHEN METABOLIC PANEL: CPT | Mod: ZL | Performed by: STUDENT IN AN ORGANIZED HEALTH CARE EDUCATION/TRAINING PROGRAM

## 2021-12-23 ASSESSMENT — EDINBURGH POSTNATAL DEPRESSION SCALE (EPDS)
I HAVE BEEN SO UNHAPPY THAT I HAVE HAD DIFFICULTY SLEEPING: NOT AT ALL
I HAVE BEEN SO UNHAPPY THAT I HAVE BEEN CRYING: NO, NEVER
TOTAL SCORE: 3
I HAVE LOOKED FORWARD WITH ENJOYMENT TO THINGS: AS MUCH AS I EVER DID
I HAVE BEEN ANXIOUS OR WORRIED FOR NO GOOD REASON: YES, SOMETIMES
I HAVE BEEN ABLE TO LAUGH AND SEE THE FUNNY SIDE OF THINGS: AS MUCH AS I ALWAYS COULD
THE THOUGHT OF HARMING MYSELF HAS OCCURRED TO ME: NEVER
I HAVE BLAMED MYSELF UNNECESSARILY WHEN THINGS WENT WRONG: NO, NEVER
I HAVE FELT SCARED OR PANICKY FOR NO GOOD REASON: NO, NOT AT ALL
THINGS HAVE BEEN GETTING ON TOP OF ME: NO, MOST OF THE TIME I HAVE COPED QUITE WELL
I HAVE FELT SAD OR MISERABLE: NO, NOT AT ALL

## 2021-12-23 NOTE — NURSING NOTE
Pt presents to clinic today for 6 week post partum care.      Medication Reconciliation: complete  Erika Martinez LPN

## 2021-12-23 NOTE — PROGRESS NOTES
Postpartum Office Visit    S: Ms. Martinez is a  who is s/p  section on 21. Her delivery was uncomplicated. Her postpartum course in the hospital was also uncomplicated. She is feeling well today. She has no acute concerns. She notes her mood has been good, denies depression or any concern for harm to herself or others. She has been breast feeding the baby. Declines  contraception.     O:   /68   Pulse 68   Wt 73.1 kg (161 lb 3.2 oz)   LMP 2021   Breastfeeding Yes   BMI 27.67 kg/m        Gen: Well-appearing, NAD  Abomen: incision is healing well, no sign of erythema, warmth, discharge or bleeding. Soft, non-tender  Pelvic: Normal external genitalia, normal hair distribution. No vaginal discharge. Cervix is without lesions. No CMT, bimanual exam without pain, mobile, no adnexal massess or tenderness.    Assessment:  Ms. Lianne Martinez is a 31 year old yo now  who is s/p  section on . She is doing well in the postpartum period.     Plan:  # Routine postpartum care  -- Feeling well, no concerns  -- breast feeding  -- Contraception goals: to be discussed at next visit  -- Mood stable and doing well        # History of ICP  -- Bile acids have returned to normal  -- AST and ALT normal  -- Alk Phos 912--> 280--> collected today              Family history of primary biliary cirrhosis in her mother. She passed away from this a few years after Lianne was born              Close monitoring of hepatic function to provide reassurance and show downtrending after delivery

## 2022-01-15 ENCOUNTER — ALLIED HEALTH/NURSE VISIT (OUTPATIENT)
Dept: FAMILY MEDICINE | Facility: OTHER | Age: 32
End: 2022-01-15
Attending: FAMILY MEDICINE
Payer: OTHER GOVERNMENT

## 2022-01-15 DIAGNOSIS — Z20.822 COVID-19 RULED OUT: Primary | ICD-10-CM

## 2022-01-15 DIAGNOSIS — R09.89 RUNNY NOSE: ICD-10-CM

## 2022-01-15 DIAGNOSIS — R11.0 NAUSEA: ICD-10-CM

## 2022-01-15 PROCEDURE — U0003 INFECTIOUS AGENT DETECTION BY NUCLEIC ACID (DNA OR RNA); SEVERE ACUTE RESPIRATORY SYNDROME CORONAVIRUS 2 (SARS-COV-2) (CORONAVIRUS DISEASE [COVID-19]), AMPLIFIED PROBE TECHNIQUE, MAKING USE OF HIGH THROUGHPUT TECHNOLOGIES AS DESCRIBED BY CMS-2020-01-R: HCPCS | Mod: ZL

## 2022-01-15 PROCEDURE — C9803 HOPD COVID-19 SPEC COLLECT: HCPCS

## 2022-01-15 NOTE — PROGRESS NOTES
Patient swabbed for COVID-19 testing.  Arianne Mcknight MA on 1/15/2022 at 11:03 AM  Nausea runny nose

## 2022-01-17 LAB — SARS-COV-2 RNA RESP QL NAA+PROBE: NEGATIVE

## 2022-03-01 ENCOUNTER — MEDICAL CORRESPONDENCE (OUTPATIENT)
Dept: HEALTH INFORMATION MANAGEMENT | Facility: OTHER | Age: 32
End: 2022-03-01
Payer: OTHER GOVERNMENT

## 2022-03-26 ENCOUNTER — HEALTH MAINTENANCE LETTER (OUTPATIENT)
Age: 32
End: 2022-03-26

## 2022-04-04 ENCOUNTER — NURSE TRIAGE (OUTPATIENT)
Dept: FAMILY MEDICINE | Facility: OTHER | Age: 32
End: 2022-04-04
Payer: OTHER GOVERNMENT

## 2022-04-04 NOTE — TELEPHONE ENCOUNTER
Request for overbook:  Patient called with symptoms of productive cough for 11 days. Patient states sputum is a dark yellow color. Patient denies a fever or difficulty breathing. Patient asking to be seen by PCP. Request for overbook being sent due to PCP schedule being full. Contact information pended.    Reason for Disposition    [1] Continuous (nonstop) coughing interferes with work or school AND [2] no improvement using cough treatment per Care Advice    Additional Information    Negative: Severe difficulty breathing (e.g., struggling for each breath, speaks in single words)    Negative: Bluish (or gray) lips or face now    Negative: [1] Difficulty breathing AND [2] exposure to flames, smoke, or fumes    Negative: [1] Stridor AND [2] difficulty breathing    Negative: Sounds like a life-threatening emergency to the triager    Negative: [1] Previous asthma attacks AND [2] this feels like asthma attack    Negative: Dry (non-productive) cough (i.e., no sputum or minimal clear sputum)    Negative: Chest pain  (Exception: MILD central chest pain, present only when coughing)    Negative: Difficulty breathing    Negative: Patient sounds very sick or weak to the triager    Negative: [1] Coughed up blood AND [2] > 1 tablespoon (15 ml) (Exception: blood-tinged sputum)    Negative: Fever > 103 F (39.4 C)    Negative: [1] Fever > 101 F (38.3 C) AND [2] age > 60    Negative: [1] Fever > 100.0 F (37.8 C) AND [2] bedridden (e.g., nursing home patient, CVA, chronic illness, recovering from surgery)    Negative: [1] Fever > 100.0 F (37.8 C) AND [2] diabetes mellitus or weak immune system (e.g., HIV positive, cancer chemo, splenectomy, organ transplant, chronic steroids)    Negative: Wheezing is present    Negative: SEVERE coughing spells (e.g., whooping sound after coughing, vomiting after coughing)    Negative: Coughing up vanessa-colored (reddish-brown) sputum    Negative: Fever present > 3 days (72 hours)    Negative: [1] Fever  "returns after gone for over 24 hours AND [2] symptoms worse or not improved    Negative: [1] Using nasal washes and pain medicine > 24 hours AND [2] sinus pain (around cheekbone or eye) persists    Answer Assessment - Initial Assessment Questions  1. ONSET: \"When did the cough begin?\"       Eleven days ago  2. SEVERITY: \"How bad is the cough today?\"       Same as yesterday  3. RESPIRATORY DISTRESS: \"Describe your breathing.\"       On concerns of breathing  4. FEVER: \"Do you have a fever?\" If so, ask: \"What is your temperature, how was it measured, and when did it start?\"      no  5. SPUTUM: \"Describe the color of your sputum\" (clear, white, yellow, green)      Yes dark yellow  6. HEMOPTYSIS: \"Are you coughing up any blood?\" If so ask: \"How much?\" (flecks, streaks, tablespoons, etc.)      no  7. CARDIAC HISTORY: \"Do you have any history of heart disease?\" (e.g., heart attack, congestive heart failure)       no  8. LUNG HISTORY: \"Do you have any history of lung disease?\"  (e.g., pulmonary embolus, asthma, emphysema)      no  9. PE RISK FACTORS: \"Do you have a history of blood clots?\" (or: recent major surgery, recent prolonged travel, bedridden)      no  10. OTHER SYMPTOMS: \"Do you have any other symptoms?\" (e.g., runny nose, wheezing, chest pain)        Runny nose  11. PREGNANCY: \"Is there any chance you are pregnant?\" \"When was your last menstrual period?\"        Not pregnant but breastfeeding  12. TRAVEL: \"Have you traveled out of the country in the last month?\" (e.g., travel history, exposures)        no    Protocols used: COUGH - ACUTE [id number]      "

## 2022-04-26 ENCOUNTER — OFFICE VISIT (OUTPATIENT)
Dept: FAMILY MEDICINE | Facility: OTHER | Age: 32
End: 2022-04-26
Attending: PHYSICIAN ASSISTANT
Payer: OTHER GOVERNMENT

## 2022-04-26 VITALS
DIASTOLIC BLOOD PRESSURE: 72 MMHG | WEIGHT: 158.8 LBS | TEMPERATURE: 97.1 F | HEART RATE: 65 BPM | BODY MASS INDEX: 27.26 KG/M2 | SYSTOLIC BLOOD PRESSURE: 112 MMHG | RESPIRATION RATE: 16 BRPM | OXYGEN SATURATION: 99 %

## 2022-04-26 DIAGNOSIS — N30.01 ACUTE CYSTITIS WITH HEMATURIA: ICD-10-CM

## 2022-04-26 DIAGNOSIS — R35.0 URINARY FREQUENCY: Primary | ICD-10-CM

## 2022-04-26 LAB
ALBUMIN UR-MCNC: NEGATIVE MG/DL
APPEARANCE UR: CLEAR
BILIRUB UR QL STRIP: NEGATIVE
COLOR UR AUTO: ABNORMAL
GLUCOSE UR STRIP-MCNC: NEGATIVE MG/DL
HGB UR QL STRIP: ABNORMAL
KETONES UR STRIP-MCNC: NEGATIVE MG/DL
LEUKOCYTE ESTERASE UR QL STRIP: ABNORMAL
MUCOUS THREADS #/AREA URNS LPF: PRESENT /LPF
NITRATE UR QL: NEGATIVE
PH UR STRIP: 6.5 [PH] (ref 5–9)
RBC URINE: 95 /HPF
SP GR UR STRIP: 1.02 (ref 1–1.03)
SQUAMOUS EPITHELIAL: 2 /HPF
UROBILINOGEN UR STRIP-MCNC: NORMAL MG/DL
WBC URINE: 22 /HPF
YEAST #/AREA URNS HPF: ABNORMAL /HPF

## 2022-04-26 PROCEDURE — 99214 OFFICE O/P EST MOD 30 MIN: CPT | Performed by: PHYSICIAN ASSISTANT

## 2022-04-26 PROCEDURE — 81003 URINALYSIS AUTO W/O SCOPE: CPT | Mod: ZL | Performed by: PHYSICIAN ASSISTANT

## 2022-04-26 PROCEDURE — 87186 SC STD MICRODIL/AGAR DIL: CPT | Mod: ZL | Performed by: PHYSICIAN ASSISTANT

## 2022-04-26 RX ORDER — SULFAMETHOXAZOLE/TRIMETHOPRIM 800-160 MG
1 TABLET ORAL 2 TIMES DAILY
Qty: 6 TABLET | Refills: 0 | Status: SHIPPED | OUTPATIENT
Start: 2022-04-26 | End: 2022-04-29

## 2022-04-26 ASSESSMENT — PAIN SCALES - GENERAL: PAINLEVEL: NO PAIN (0)

## 2022-04-26 NOTE — PROGRESS NOTES
"ASSESSMENT/PLAN:    I have reviewed the nursing notes.  I have reviewed the findings, diagnosis, plan and need for follow up with the patient.    1. Urinary frequency  - UA with Microscopic reflex to Culture  - Urine Culture  - UTI noted. Breastfeeding patient. PCN allergy, she is unsure if she has had complications/adverse reaction with cephalosporins.     2. Acute cystitis with hematuria  - sulfamethoxazole-trimethoprim (BACTRIM DS) 800-160 MG tablet; Take 1 tablet by mouth 2 times daily for 3 days  Dispense: 6 tablet; Refill: 0  - Vitals stable. PE available for review below. UA results: see below. Based off UA results, patient does meet criteria for antibiotic therapy. I did discuss with patient that if a urine culture was performed, that we will inform patient if a change to their treatment plan needs to occur based off culture results. In the meantime, recommend, alternating tylenol and ibuprofen every 4-6 hours as needed, warm heating pad, pushing fluids/hydration, cranberry juice/pills, urinating when urge arises. May also try over the counter remedies such as Azo (as long as pyridium not already prescribed). Patient aware that if they do take Azo, that this medication can change color of urine to an \"orange\" color. If fevers, chills, flank pain/back pain, inability to urinate/struggle to urinate, signs of dehydration or other worrisome signs occur, patient agreeable to follow up for reevaluation. Patient is in agreement and understanding of the above treatment plan. All questions and concerns were addressed and answered to patient's satisfaction. AVS reviewed with patient.     Discussed warning signs/symptoms indicative of need to f/u    Follow up if symptoms persist or worsen or concerns    I explained my diagnostic considerations and recommendations to the patient, who voiced understanding and agreement with the treatment plan. All questions were answered. We discussed potential side effects of any " prescribed or recommended therapies, as well as expectations for response to treatments.    Kary Christianson PA-C  2022  6:33 PM    HPI:    Lianne Martinez is a 32 year old female  who presents to [facility] today for concerns of possible UTI, x 2 days    Symptoms: urgency, frequency and suprapubic pain and pressure  Flank Pain or Back Pain: No  Blood in Urine: No  Last Urination: [facility]   Able to Completely Urinate/Void: No, incomplete emptying   Prior UTIs: Yes x 2  Exposures to STIs/STDs: No  Fevers, chills, N/V/D: No  Change in Bowel Habits: No  Vaginal Symptoms or Discharge: No  Recent swimming/use of hot tubs/swimming pools/lakes: No    LMP: 22    Denies CP, SOB, calf tenderness. No new medications. Denies exposure to ill or COVID positive patients.     Allergies: PCN    PCP: [facility]UBERT Walker    Past Medical History:   Diagnosis Date     Family history of genetic disease     mother  in her 30s of primary biliary cirrhosis. LFTs 18 WNL      IBS (irritable bowel syndrome)      Vulvodynia 2018     Past Surgical History:   Procedure Laterality Date     C/SECTION, LOW TRANSVERSE  10/03/2015      SECTION N/A 2021    Procedure: REPEAT  SECTION;  Surgeon: Ellie Funez MD;  Location:  OR     Social History     Tobacco Use     Smoking status: Never Smoker     Smokeless tobacco: Never Used   Substance Use Topics     Alcohol use: Never     Current Outpatient Medications   Medication Sig Dispense Refill     Multiple Vitamins-Minerals (MULTIVITAMIN ADULT PO) Take 1 tablet by mouth daily       Allergies   Allergen Reactions     Penicillins      Throat itching, rash     Past medical history, past surgical history, current medications and allergies reviewed and accurate to the best of my knowledge.      ROS:  Refer to HPI    /72   Pulse 65   Temp 97.1  F (36.2  C) (Tympanic)   Resp 16   Wt 72 kg (158 lb 12.8 oz)   LMP 2022   SpO2 99%   Breastfeeding Yes    BMI 27.26 kg/m      EXAM:  General Appearance: Well appearing 32 year old female, appropriate appearance for age. No acute distress   Respiratory: normal chest wall and respirations.  Normal effort.  Clear to auscultation bilaterally, no wheezing, crackles or rhonchi.  No increased work of breathing.  No cough appreciated.  Cardiac: RRR with no murmurs  Abdomen: soft, nontender, no rigidity, no rebound tenderness or guarding, normal bowel sounds present  :  No suprapubic tenderness to palpation.  Absent CVA tenderness to palpation.    Musculoskeletal:  Equal movement of bilateral upper extremities.  Equal movement of bilateral lower extremities.  Normal gait.    Dermatological: no rashes noted of exposed skin  Psychological: normal affect, alert, oriented, and pleasant.     Labs:  Results for orders placed or performed in visit on 04/26/22   UA with Microscopic reflex to Culture     Status: Abnormal    Specimen: Urine, Midstream   Result Value Ref Range    Color Urine Light Yellow Colorless, Straw, Light Yellow, Yellow    Appearance Urine Clear Clear    Glucose Urine Negative Negative mg/dL    Bilirubin Urine Negative Negative    Ketones Urine Negative Negative mg/dL    Specific Gravity Urine 1.022 1.000 - 1.030    Blood Urine Moderate (A) Negative    pH Urine 6.5 5.0 - 9.0    Protein Albumin Urine Negative Negative mg/dL    Urobilinogen Urine Normal Normal, 2.0 mg/dL    Nitrite Urine Negative Negative    Leukocyte Esterase Urine Moderate (A) Negative    Budding Yeast Urine None Seen None Seen /HPF    Mucus Urine Present (A) None Seen /LPF    RBC Urine 95 (H) <=2 /HPF    WBC Urine 22 (H) <=5 /HPF    Squamous Epithelials Urine 2 (H) <=1 /HPF    Narrative    Urine Culture ordered based on laboratory criteria     Xray:  None

## 2022-04-26 NOTE — NURSING NOTE
"Chief Complaint   Patient presents with     Urinary Frequency     For 2 days   She has had urinary freqeuncy and urgency for 2 days. She states she get a tingling sensation when she urinates.  Bell Singh LPN..................4/26/2022   6:27 PM      Initial /72   Pulse 65   Temp 97.1  F (36.2  C) (Tympanic)   Resp 16   Wt 72 kg (158 lb 12.8 oz)   LMP 04/04/2022   SpO2 99%   Breastfeeding Yes   BMI 27.26 kg/m   Estimated body mass index is 27.26 kg/m  as calculated from the following:    Height as of 4/6/21: 1.626 m (5' 4\").    Weight as of this encounter: 72 kg (158 lb 12.8 oz).  Medication Reconciliation: complete    FOOD SECURITY SCREENING QUESTIONS  Hunger Vital Signs:  Within the past 12 months we worried whether our food would run out before we got money to buy more. Never  Within the past 12 months the food we bought just didn't last and we didn't have money to get more. Never        Advance care directive on file? no  Advance care directive provided to patient? declined     Bell Singh LPN  "

## 2022-04-26 NOTE — PATIENT INSTRUCTIONS
You were prescribed an antibiotic, please take into consideration the following information:  - Take entire course of antibiotic even if you start to feel better.  - Antibiotics can cause stomach upset including nausea and diarrhea. Read your bottle or ask the pharmacist if antibiotic can be taken with food to help prevent nausea. If you have symptoms of diarrhea you can take an over-the-counter probiotic and/or increase foods with probiotics such as yogurt, Young, sauerkraut.  -Use caution in sunlight as can lead to increased risk of sunburn while on ABX (antibiotics).     Please refer to your AVS for follow up and pain/symptoms management recommendations (I.e.: medications, helpful conservative treatment modalities, appropriate follow up if need to a specialist or family practice, etc.). Please return to urgent care if your symptoms change or worsen.     Discharge instructions:  -Follow up with persistent symptoms with primary care (or ER if severe worsening)  -If you were prescribed a medication(s), please take this as prescribed/directed  -Monitor your symptoms, if changing/worsening, return to UC/ER or PCP for follow up    Urinary Tract Infection (UTI):  -If you were prescribed an antibiotic, please take this as directed and until completion.     -For pain control (if applicable), alternating Tylenol and Ibuprofen is recommended  -Alternate every 4 hours as needed. I.e.: Ibuprofen at 8am, Tylenol 12pm, Ibuprofen 4pm   -Daily maximum of Tylenol is 4000mg (recommend staying under 3000mg)  -Daily maximum of Ibuprofen is 3200 mg    -A warm heating pad may help as well.     -Rest/relaxation and keeping hydrated with clear liquids (ie: water or cranberry juice - do not do cranberry juice if on Coumadin/Warfarin).     -Some urine samples are sent out for culture - if a change to your antibiotics is needed based off your urine culture, a member of the urgent care team will call you and inform you of this.     -Empty  your bladder when you feel the urge versus holding urine, after urinating you can bear-down to empty bladder completely, after peeing wipe front to back to avoid introducing bacteria towards vaginal area.     -AZO/Pyridium - may take up to 3 times a day, note that this may turn your urine orange    -Avoid sexual intercourse until you have completed your medication.     -Return to ER if any of the following occur: Worsening of symptoms. Fever over 101 F (38.3 C), No improvement by the third day of treatment, Increasing back or abdominal pain, vomiting, unable to keep fluids or medicine down, weakness, dizziness, or fainting, vaginal discharge, pain, redness, or swelling of the vaginal area

## 2022-04-28 LAB — BACTERIA UR CULT: ABNORMAL

## 2022-05-03 ENCOUNTER — MEDICAL CORRESPONDENCE (OUTPATIENT)
Dept: HEALTH INFORMATION MANAGEMENT | Facility: OTHER | Age: 32
End: 2022-05-03
Payer: OTHER GOVERNMENT

## 2022-07-05 ENCOUNTER — OFFICE VISIT (OUTPATIENT)
Dept: FAMILY MEDICINE | Facility: OTHER | Age: 32
End: 2022-07-05
Attending: PHYSICIAN ASSISTANT
Payer: OTHER GOVERNMENT

## 2022-07-05 VITALS
DIASTOLIC BLOOD PRESSURE: 66 MMHG | HEART RATE: 67 BPM | OXYGEN SATURATION: 100 % | SYSTOLIC BLOOD PRESSURE: 104 MMHG | BODY MASS INDEX: 27.43 KG/M2 | RESPIRATION RATE: 16 BRPM | TEMPERATURE: 97.1 F | WEIGHT: 159.8 LBS

## 2022-07-05 DIAGNOSIS — B37.31 YEAST INFECTION OF THE VAGINA: ICD-10-CM

## 2022-07-05 DIAGNOSIS — N89.8 VAGINAL DISCHARGE: Primary | ICD-10-CM

## 2022-07-05 LAB
CLUE CELLS: ABNORMAL
TRICHOMONAS, WET PREP: ABNORMAL
WBC'S/HIGH POWER FIELD, WET PREP: ABNORMAL
YEAST, WET PREP: PRESENT

## 2022-07-05 PROCEDURE — 87210 SMEAR WET MOUNT SALINE/INK: CPT | Mod: ZL | Performed by: PHYSICIAN ASSISTANT

## 2022-07-05 PROCEDURE — 99213 OFFICE O/P EST LOW 20 MIN: CPT | Performed by: PHYSICIAN ASSISTANT

## 2022-07-05 RX ORDER — FLUCONAZOLE 150 MG/1
150 TABLET ORAL
Qty: 3 TABLET | Refills: 0 | Status: SHIPPED | OUTPATIENT
Start: 2022-07-05 | End: 2022-09-29

## 2022-07-05 ASSESSMENT — PAIN SCALES - GENERAL: PAINLEVEL: MODERATE PAIN (4)

## 2022-07-05 NOTE — NURSING NOTE
"Chief Complaint   Patient presents with     Vaginal Problem     Patient is here for vaginal itchiness, pain and discharge that was noticed yesterday.     Initial /66   Pulse 67   Temp 97.1  F (36.2  C) (Tympanic)   Resp 16   Wt 72.5 kg (159 lb 12.8 oz)   SpO2 100%   BMI 27.43 kg/m   Estimated body mass index is 27.43 kg/m  as calculated from the following:    Height as of 4/6/21: 1.626 m (5' 4\").    Weight as of this encounter: 72.5 kg (159 lb 12.8 oz).  Medication Reconciliation: complete    Asha Waite LPN  "

## 2022-07-05 NOTE — PROGRESS NOTES
ASSESSMENT/PLAN:    I have reviewed the nursing notes.  I have reviewed the findings, diagnosis, plan and need for follow up with the patient.    1. Vaginal discharge  - Wet Prep, Genital  - Yeast noted. Negative clue cells and trichomonas.     2. Yeast infection of the vagina  - fluconazole (DIFLUCAN) 150 MG tablet; Take 1 tablet (150 mg) by mouth every 72 hours as needed (Vaginal itching/discharge)  Dispense: 3 tablet; Refill: 0  - Yeast infection - Patient given fluconazole 150mg #3.  Take 1 tab of fluconazole now.  If you are still having persistent symptoms after 72 hours, you may take a second dose. I prescribed 3 doses in case needed.     If you have a vaginal yeast infection, follow these guidelines:   Follow the full treatment prescribed by your healthcare provider.   After urinating, wipe gently to avoid irritation.   Use unscented soaps.   Avoid using douches and other chemicals, such as bubble bath or hygiene spray in the vaginal area unless recommended by your healthcare provider.   Take a shower instead of a bath. Pat the genital area dry.   Wear cotton underwear to allow ventilation and to keep the area drier.   Avoid sexual intercourse until the infection is gone.     Patient is in agreement and understanding of the above treatment plan. All questions and concerns were addressed and answered to patient's satisfaction. AVS reviewed with patient, placed on MyChart    Discussed warning signs/symptoms indicative of need to f/u    Follow up if symptoms persist or worsen or concerns    I explained my diagnostic considerations and recommendations to the patient, who voiced understanding and agreement with the treatment plan. All questions were answered. We discussed potential side effects of any prescribed or recommended therapies, as well as expectations for response to treatments.    Shrari Flower  7/5/2022  11:35 AM    I was present with Sharri Flower, NP student who participated in the service and in the  documentation of the note. I have verified the history and personally performed the physical exam and medical decision making. I agree with the assessment and plan of care as documented in the note.     Kary Christianson PA-C  2022  11:45 AM    HPI:    Lianne Martinez is a 32 year old female  who presents to [facility] today for concerns of white vaginal discharge. She recently was treated with Bactrim (3 day course) for a UTI approximately 3 weeks ago. She notes currently, vaginal discharge (as noted above), itching and irritation).     Parity:     Vaginal Symptoms:  New Partners: No  Exposure to STIs: No  History of STIs: No  Condom Usage: No  Fevers, Chills, Signs of Infection: No    Treatments Tried and Outcome: none    PCP: [facility]ERT Walker    Past Medical History:   Diagnosis Date     Family history of genetic disease     mother  in her 30s of primary biliary cirrhosis. LFTs 18 WNL      IBS (irritable bowel syndrome)      Vulvodynia 2018     Past Surgical History:   Procedure Laterality Date     C/SECTION, LOW TRANSVERSE  10/03/2015      SECTION N/A 2021    Procedure: REPEAT  SECTION;  Surgeon: Ellie Funez MD;  Location:  OR     Social History     Tobacco Use     Smoking status: Never Smoker     Smokeless tobacco: Never Used   Substance Use Topics     Alcohol use: Never     Current Outpatient Medications   Medication Sig Dispense Refill     Multiple Vitamins-Minerals (MULTIVITAMIN ADULT PO) Take 1 tablet by mouth daily       Allergies   Allergen Reactions     Penicillins      Throat itching, rash     Past medical history, past surgical history, current medications and allergies reviewed and accurate to the best of my knowledge.      ROS:  Refer to HPI    /66   Pulse 67   Temp 97.1  F (36.2  C) (Tympanic)   Resp 16   Wt 72.5 kg (159 lb 12.8 oz)   SpO2 100%   BMI 27.43 kg/m      EXAM:  General Appearance: Well appearing 32 year old female, appropriate  appearance for age. No acute distress   Respiratory: normal chest wall and respirations.  Normal effort.  Clear to auscultation bilaterally, no wheezing, crackles or rhonchi.  No increased work of breathing.  No cough appreciated.  Cardiac: RRR with no murmurs  Abdomen: soft, nontender, no rigidity, no rebound tenderness or guarding, normal bowel sounds present  :  No suprapubic tenderness to palpation.  Absent CVA tenderness to palpation.    GYN: normal vagina and vulva, vaginal discharge described as white and curd-like, exam chaperoned by nurse  Psychological: normal affect, alert, oriented, and pleasant.     Labs:  Results for orders placed or performed in visit on 07/05/22   Wet Prep, Genital     Status: Abnormal    Specimen: Vagina; Swab   Result Value Ref Range    Trichomonas Absent Absent    Yeast Present (A) Absent    Clue Cells Absent Absent    WBCs/high power field 2+ (A) None     Xray:  None

## 2022-09-18 ENCOUNTER — HEALTH MAINTENANCE LETTER (OUTPATIENT)
Age: 32
End: 2022-09-18

## 2022-09-28 NOTE — PROGRESS NOTES
SUBJECTIVE:   CC: Lianne is an 32 year old who presents for preventive health visit.       Patient has been advised of split billing requirements and indicates understanding: Yes  Healthy Habits:     Getting at least 3 servings of Calcium per day:  Yes    Bi-annual eye exam:  Yes    Dental care twice a year:  Yes    Sleep apnea or symptoms of sleep apnea:  None    Diet:  Regular (no restrictions)    Frequency of exercise:  4-5 days/week    Duration of exercise:  30-45 minutes    Taking medications regularly:  Yes    PHQ-2 Total Score: 0    Additional concerns today:  Yes    Lianne had her second child in Nov 2021. She developed cholestasis around 37 weeks and delivered her baby girl after diagnosis via c section. Her liver enzymes stabilized around 2 weeks post partum.     Lianne has vaginal itching and discharge. She would like to be treated for a yeast infection. She feels the same as when she had a yeast infection in the past.     Lianne is concerned for breast cancer with a strong family history. She has a parental aunt that was 34 and diagnosed with breast cancer. She would like a baseline mammogram. She will assure insurance coverage prior to getting a mammogram.       Today's PHQ-2 Score:   PHQ-2 ( 1999 Pf[employer]) 9/29/2022   Q1: Little interest or pleasure in doing things 0   Q2: Feeling down, depressed or hopeless 0   PHQ-2 Score 0   PHQ-2 Total Score (12-17 Years)- Positive if 3 or more points; Administer PHQ-A if positive -   Q1: Little interest or pleasure in doing things Not at all   Q2: Feeling down, depressed or hopeless Not at all   PHQ-2 Score 0       Abuse: Current or Past (Physical, Sexual or Emotional) - No  Do you feel safe in your environment? Yes    Have you ever done Advance Care Planning? (For example, a Health Directive, POLST, or a discussion with a medical provider or your loved ones about your wishes): No, advance care planning information given to patient to review.  Patient declined  advance care planning discussion at this time.    Social History     Tobacco Use     Smoking status: Never Smoker     Smokeless tobacco: Never Used   Substance Use Topics     Alcohol use: Never     If you drink alcohol do you typically have >3 drinks per day or >7 drinks per week? No    Alcohol Use 9/29/2022   Prescreen: >3 drinks/day or >7 drinks/week? No   Prescreen: >3 drinks/day or >7 drinks/week? -   No flowsheet data found.    Reviewed orders with patient.  Reviewed health maintenance and updated orders accordingly - Yes      Breast Cancer Screening:  Any new diagnosis of family breast, ovarian, or bowel cancer? No    FHS-7:   Breast CA Risk Assessment (FHS-7) 9/29/2022   Did any of your first-degree relatives have breast or ovarian cancer? No   Did any of your relatives have bilateral breast cancer? Unknown   Did any man in your family have breast cancer? No   Did any woman in your family have breast and ovarian cancer? Unknown   Did any woman in your family have breast cancer before age 50 y? Yes   Do you have 2 or more relatives with breast and/or ovarian cancer? Unknown   Do you have 2 or more relatives with breast and/or bowel cancer? Unknown       Strong family history of breast cancer. Aunt age 34.   Pertinent mammograms are reviewed under the imaging tab.    History of abnormal Pap smear: NO - age 30-65 PAP every 5 years with negative HPV co-testing recommended  PAP / HPV Latest Ref Rng & Units 4/9/2021 4/6/2021   PAP (Historical) - - NIL   HPV16 NEG:Negative Negative -   HPV18 NEG:Negative Negative -   HRHPV NEG:Negative Negative -     Reviewed and updated as needed this visit by clinical staff   Tobacco   Meds                Reviewed and updated as needed this visit by Provider                     Review of Systems  CONSTITUTIONAL: NEGATIVE for fever, chills, change in weight  INTEGUMENTARU/SKIN: NEGATIVE for worrisome rashes, moles or lesions  EYES: NEGATIVE for vision changes or irritation  ENT:  "NEGATIVE for ear, mouth and throat problems  RESP: NEGATIVE for significant cough or SOB  BREAST: NEGATIVE for masses, tenderness or discharge  CV: NEGATIVE for chest pain, palpitations or peripheral edema  GI: NEGATIVE for nausea, abdominal pain, heartburn, or change in bowel habits  : NEGATIVE for unusual urinary or vaginal symptoms. Periods are regular.  MUSCULOSKELETAL: NEGATIVE for significant arthralgias or myalgia  NEURO: NEGATIVE for weakness, dizziness or paresthesias  PSYCHIATRIC: NEGATIVE for changes in mood or affect     OBJECTIVE:   /80   Pulse 78   Temp 97.4  F (36.3  C) (Tympanic)   Ht 1.6 m (5' 3\")   Wt 71.2 kg (157 lb)   LMP 09/02/2022 (Approximate)   SpO2 99%   BMI 27.81 kg/m    Physical Exam  GENERAL: healthy, alert and no distress  EYES: Eyes grossly normal to inspection, PERRL and conjunctivae and sclerae normal  HENT: ear canals and TM's normal, nose and mouth without ulcers or lesions  NECK: no adenopathy, no asymmetry, masses, or scars and thyroid normal to palpation  RESP: lungs clear to auscultation - no rales, rhonchi or wheezes  BREAST: declined exam today   CV: regular rate and rhythm, normal S1 S2, no S3 or S4, no murmur, click or rub, no peripheral edema and peripheral pulses strong  ABDOMEN: soft, nontender, no hepatosplenomegaly, no masses and bowel sounds normal   (female): declined exam today   MS: no gross musculoskeletal defects noted, no edema  SKIN: no suspicious lesions or rashes  NEURO: Normal strength and tone, mentation intact and speech normal  PSYCH: mentation appears normal, affect normal/bright    Diagnostic Test Results:  Labs reviewed in Epic    ASSESSMENT/PLAN:       ICD-10-CM    1. Routine general medical examination at a health care facility  Z00.00 CBC with platelets and differential     Comprehensive metabolic panel     TSH with free T4 reflex     Lipid Profile (Chol, Trig, HDL, LDL calc)     Iron and iron binding capacity     Ferritin     CBC " "with platelets and differential     Comprehensive metabolic panel     TSH with free T4 reflex     Lipid Profile (Chol, Trig, HDL, LDL calc)     Iron and iron binding capacity     Ferritin   2. Encounter for screening mammogram for breast cancer  Z12.31 MA Screen Bilateral w/Gutierrez   3. Yeast infection of the vagina  B37.3 fluconazole (DIFLUCAN) 150 MG tablet         COUNSELING:  Reviewed preventive health counseling, as reflected in patient instructions       Regular exercise       Healthy diet/nutrition       Vision screening       Immunizations    Declined: Influenza due to Other doesn't want                Family planning    Estimated body mass index is 27.81 kg/m  as calculated from the following:    Height as of this encounter: 1.6 m (5' 3\").    Weight as of this encounter: 71.2 kg (157 lb).    Weight management plan: Discussed healthy diet and exercise guidelines    She reports that she has never smoked. She has never used smokeless tobacco.      Counseling Resources:  ATP IV Guidelines  Pooled Cohorts Equation Calculator  Breast Cancer Risk Calculator  BRCA-Related Cancer Risk Assessment: FHS-7 Tool  FRAX Risk Assessment  ICSI Preventive Guidelines  Dietary Guidelines for Americans, 2010  USDA's MyPlate  ASA Prophylaxis  Lung CA Screening    WILFREDO Silveira CNP  [facility]  "

## 2022-09-29 ENCOUNTER — OFFICE VISIT (OUTPATIENT)
Dept: FAMILY MEDICINE | Facility: OTHER | Age: 32
End: 2022-09-29
Attending: NURSE PRACTITIONER
Payer: OTHER GOVERNMENT

## 2022-09-29 ENCOUNTER — TELEPHONE (OUTPATIENT)
Dept: FAMILY MEDICINE | Facility: OTHER | Age: 32
End: 2022-09-29

## 2022-09-29 VITALS
OXYGEN SATURATION: 99 % | SYSTOLIC BLOOD PRESSURE: 120 MMHG | TEMPERATURE: 97.4 F | DIASTOLIC BLOOD PRESSURE: 80 MMHG | WEIGHT: 157 LBS | BODY MASS INDEX: 27.82 KG/M2 | HEART RATE: 78 BPM | HEIGHT: 63 IN

## 2022-09-29 DIAGNOSIS — Z00.00 ROUTINE GENERAL MEDICAL EXAMINATION AT A HEALTH CARE FACILITY: Primary | ICD-10-CM

## 2022-09-29 DIAGNOSIS — B37.31 YEAST INFECTION OF THE VAGINA: ICD-10-CM

## 2022-09-29 DIAGNOSIS — Z12.31 ENCOUNTER FOR SCREENING MAMMOGRAM FOR BREAST CANCER: ICD-10-CM

## 2022-09-29 LAB
ALBUMIN SERPL-MCNC: 4.2 G/DL (ref 3.4–5)
ALP SERPL-CCNC: 68 U/L (ref 40–150)
ALT SERPL W P-5'-P-CCNC: 22 U/L (ref 0–50)
ANION GAP SERPL CALCULATED.3IONS-SCNC: 7 MMOL/L (ref 3–14)
AST SERPL W P-5'-P-CCNC: 16 U/L (ref 0–45)
BASOPHILS # BLD AUTO: 0 10E3/UL (ref 0–0.2)
BASOPHILS NFR BLD AUTO: 0 %
BILIRUB SERPL-MCNC: 0.5 MG/DL (ref 0.2–1.3)
BUN SERPL-MCNC: 11 MG/DL (ref 7–30)
CALCIUM SERPL-MCNC: 9.4 MG/DL (ref 8.5–10.1)
CHLORIDE BLD-SCNC: 106 MMOL/L (ref 94–109)
CHOLEST SERPL-MCNC: 119 MG/DL
CO2 SERPL-SCNC: 25 MMOL/L (ref 20–32)
CREAT SERPL-MCNC: 0.68 MG/DL (ref 0.52–1.04)
EOSINOPHIL # BLD AUTO: 0.1 10E3/UL (ref 0–0.7)
EOSINOPHIL NFR BLD AUTO: 1 %
ERYTHROCYTE [DISTWIDTH] IN BLOOD BY AUTOMATED COUNT: 14.2 % (ref 10–15)
FASTING STATUS PATIENT QL REPORTED: NO
FERRITIN SERPL-MCNC: 6 NG/ML (ref 12–150)
GFR SERPL CREATININE-BSD FRML MDRD: >90 ML/MIN/1.73M2
GLUCOSE BLD-MCNC: 89 MG/DL (ref 70–99)
HCT VFR BLD AUTO: 42.1 % (ref 35–47)
HDLC SERPL-MCNC: 62 MG/DL
HGB BLD-MCNC: 14 G/DL (ref 11.7–15.7)
IRON SATN MFR SERPL: 12 % (ref 15–46)
IRON SERPL-MCNC: 52 UG/DL (ref 35–180)
LDLC SERPL CALC-MCNC: 37 MG/DL
LYMPHOCYTES # BLD AUTO: 1.3 10E3/UL (ref 0.8–5.3)
LYMPHOCYTES NFR BLD AUTO: 13 %
MCH RBC QN AUTO: 28.1 PG (ref 26.5–33)
MCHC RBC AUTO-ENTMCNC: 33.3 G/DL (ref 31.5–36.5)
MCV RBC AUTO: 84 FL (ref 78–100)
MONOCYTES # BLD AUTO: 0.6 10E3/UL (ref 0–1.3)
MONOCYTES NFR BLD AUTO: 6 %
NEUTROPHILS # BLD AUTO: 7.9 10E3/UL (ref 1.6–8.3)
NEUTROPHILS NFR BLD AUTO: 80 %
NONHDLC SERPL-MCNC: 57 MG/DL
PLATELET # BLD AUTO: 305 10E3/UL (ref 150–450)
POTASSIUM BLD-SCNC: 4 MMOL/L (ref 3.4–5.3)
PROT SERPL-MCNC: 7.5 G/DL (ref 6.8–8.8)
RBC # BLD AUTO: 4.99 10E6/UL (ref 3.8–5.2)
SODIUM SERPL-SCNC: 138 MMOL/L (ref 133–144)
TIBC SERPL-MCNC: 434 UG/DL (ref 240–430)
TRIGL SERPL-MCNC: 100 MG/DL
TSH SERPL DL<=0.005 MIU/L-ACNC: 0.8 MU/L (ref 0.4–4)
WBC # BLD AUTO: 9.9 10E3/UL (ref 4–11)

## 2022-09-29 PROCEDURE — 80061 LIPID PANEL: CPT | Performed by: NURSE PRACTITIONER

## 2022-09-29 PROCEDURE — 82728 ASSAY OF FERRITIN: CPT | Performed by: NURSE PRACTITIONER

## 2022-09-29 PROCEDURE — 36415 COLL VENOUS BLD VENIPUNCTURE: CPT | Performed by: NURSE PRACTITIONER

## 2022-09-29 PROCEDURE — 80050 GENERAL HEALTH PANEL: CPT | Performed by: NURSE PRACTITIONER

## 2022-09-29 PROCEDURE — 83550 IRON BINDING TEST: CPT | Performed by: NURSE PRACTITIONER

## 2022-09-29 PROCEDURE — 99395 PREV VISIT EST AGE 18-39: CPT | Performed by: NURSE PRACTITIONER

## 2022-09-29 RX ORDER — FLUCONAZOLE 150 MG/1
150 TABLET ORAL ONCE
Qty: 1 TABLET | Refills: 0 | Status: SHIPPED | OUTPATIENT
Start: 2022-09-29 | End: 2022-09-29

## 2022-09-29 ASSESSMENT — ENCOUNTER SYMPTOMS
DIZZINESS: 0
HEADACHES: 0
CONSTIPATION: 0
NAUSEA: 0
WEAKNESS: 0
CHILLS: 0
COUGH: 0
BREAST MASS: 0
ABDOMINAL PAIN: 0
FREQUENCY: 0
DYSURIA: 0
FEVER: 0
EYE PAIN: 0
JOINT SWELLING: 0
HEMATOCHEZIA: 0
ARTHRALGIAS: 0
HEARTBURN: 0
PARESTHESIAS: 0
NERVOUS/ANXIOUS: 0
SHORTNESS OF BREATH: 0
PALPITATIONS: 0
DIARRHEA: 0
HEMATURIA: 0
SORE THROAT: 0
MYALGIAS: 0

## 2022-09-29 ASSESSMENT — PAIN SCALES - GENERAL: PAINLEVEL: NO PAIN (0)

## 2022-09-29 NOTE — TELEPHONE ENCOUNTER
----- Message from WILFREDO Silveira CNP sent at 9/29/2022  3:07 PM CDT -----  Ferritin is low which is part of iron. Iron saturation is also low. I would like her to start on a daily iron supplement with recheck labs in 3 months. Take iron with food and orange juice for better absorption. Stools will turn black.

## 2022-09-29 NOTE — NURSING NOTE
"Chief Complaint   Patient presents with     Physical       Initial /80   Pulse 78   Temp 97.4  F (36.3  C) (Tympanic)   Ht 1.6 m (5' 3\")   Wt 71.2 kg (157 lb)   LMP 09/02/2022 (Approximate)   SpO2 99%   BMI 27.81 kg/m   Estimated body mass index is 27.81 kg/m  as calculated from the following:    Height as of this encounter: 1.6 m (5' 3\").    Weight as of this encounter: 71.2 kg (157 lb).  Medication Reconciliation: complete  Kyle Singletary LPN  "

## 2022-10-13 ENCOUNTER — TELEPHONE (OUTPATIENT)
Dept: MAMMOGRAPHY | Facility: OTHER | Age: 32
End: 2022-10-13

## 2022-10-13 ENCOUNTER — ANCILLARY PROCEDURE (OUTPATIENT)
Dept: MAMMOGRAPHY | Facility: OTHER | Age: 32
End: 2022-10-13
Attending: NURSE PRACTITIONER
Payer: OTHER GOVERNMENT

## 2022-10-13 DIAGNOSIS — Z12.31 ENCOUNTER FOR SCREENING MAMMOGRAM FOR BREAST CANCER: ICD-10-CM

## 2022-10-13 PROCEDURE — 77067 SCR MAMMO BI INCL CAD: CPT | Mod: TC | Performed by: RADIOLOGY

## 2023-09-27 ENCOUNTER — OFFICE VISIT (OUTPATIENT)
Dept: FAMILY MEDICINE | Facility: OTHER | Age: 33
End: 2023-09-27
Attending: NURSE PRACTITIONER
Payer: OTHER GOVERNMENT

## 2023-09-27 VITALS
WEIGHT: 162.5 LBS | BODY MASS INDEX: 28.79 KG/M2 | OXYGEN SATURATION: 98 % | SYSTOLIC BLOOD PRESSURE: 123 MMHG | RESPIRATION RATE: 16 BRPM | DIASTOLIC BLOOD PRESSURE: 85 MMHG | TEMPERATURE: 97.9 F | HEART RATE: 73 BPM

## 2023-09-27 DIAGNOSIS — R00.0 TACHYCARDIA: Primary | ICD-10-CM

## 2023-09-27 LAB
ALBUMIN SERPL BCG-MCNC: 4.7 G/DL (ref 3.5–5.2)
ALP SERPL-CCNC: 63 U/L (ref 35–104)
ALT SERPL W P-5'-P-CCNC: 21 U/L (ref 0–50)
ANION GAP SERPL CALCULATED.3IONS-SCNC: 14 MMOL/L (ref 7–15)
AST SERPL W P-5'-P-CCNC: 35 U/L (ref 0–45)
BILIRUB SERPL-MCNC: 0.3 MG/DL
BUN SERPL-MCNC: 9.3 MG/DL (ref 6–20)
CALCIUM SERPL-MCNC: 9.9 MG/DL (ref 8.6–10)
CHLORIDE SERPL-SCNC: 105 MMOL/L (ref 98–107)
CREAT SERPL-MCNC: 0.71 MG/DL (ref 0.51–0.95)
DEPRECATED HCO3 PLAS-SCNC: 23 MMOL/L (ref 22–29)
EGFRCR SERPLBLD CKD-EPI 2021: >90 ML/MIN/1.73M2
ERYTHROCYTE [DISTWIDTH] IN BLOOD BY AUTOMATED COUNT: 12.7 % (ref 10–15)
FERRITIN SERPL-MCNC: 25 NG/ML (ref 6–175)
GLUCOSE SERPL-MCNC: 89 MG/DL (ref 70–99)
HCT VFR BLD AUTO: 42.4 % (ref 35–47)
HGB BLD-MCNC: 14.5 G/DL (ref 11.7–15.7)
IRON BINDING CAPACITY (ROCHE): 365 UG/DL (ref 240–430)
IRON SATN MFR SERPL: 16 % (ref 15–46)
IRON SERPL-MCNC: 58 UG/DL (ref 37–145)
MAGNESIUM SERPL-MCNC: 1.9 MG/DL (ref 1.7–2.3)
MCH RBC QN AUTO: 29.9 PG (ref 26.5–33)
MCHC RBC AUTO-ENTMCNC: 34.2 G/DL (ref 31.5–36.5)
MCV RBC AUTO: 87 FL (ref 78–100)
PLATELET # BLD AUTO: 350 10E3/UL (ref 150–450)
POTASSIUM SERPL-SCNC: 4.3 MMOL/L (ref 3.4–5.3)
PROT SERPL-MCNC: 7.3 G/DL (ref 6.4–8.3)
RBC # BLD AUTO: 4.85 10E6/UL (ref 3.8–5.2)
SODIUM SERPL-SCNC: 142 MMOL/L (ref 135–145)
WBC # BLD AUTO: 6.9 10E3/UL (ref 4–11)

## 2023-09-27 PROCEDURE — 85027 COMPLETE CBC AUTOMATED: CPT | Performed by: NURSE PRACTITIONER

## 2023-09-27 PROCEDURE — 83540 ASSAY OF IRON: CPT | Performed by: NURSE PRACTITIONER

## 2023-09-27 PROCEDURE — 82306 VITAMIN D 25 HYDROXY: CPT | Performed by: NURSE PRACTITIONER

## 2023-09-27 PROCEDURE — 82728 ASSAY OF FERRITIN: CPT | Performed by: NURSE PRACTITIONER

## 2023-09-27 PROCEDURE — 83735 ASSAY OF MAGNESIUM: CPT | Performed by: NURSE PRACTITIONER

## 2023-09-27 PROCEDURE — 83550 IRON BINDING TEST: CPT | Performed by: NURSE PRACTITIONER

## 2023-09-27 PROCEDURE — 99213 OFFICE O/P EST LOW 20 MIN: CPT | Performed by: NURSE PRACTITIONER

## 2023-09-27 PROCEDURE — 36415 COLL VENOUS BLD VENIPUNCTURE: CPT | Performed by: NURSE PRACTITIONER

## 2023-09-27 PROCEDURE — 93000 ELECTROCARDIOGRAM COMPLETE: CPT | Mod: 77 | Performed by: HOSPITALIST

## 2023-09-27 PROCEDURE — 80053 COMPREHEN METABOLIC PANEL: CPT | Performed by: NURSE PRACTITIONER

## 2023-09-27 RX ORDER — VITAMIN B COMPLEX
TABLET ORAL DAILY
COMMUNITY

## 2023-09-27 ASSESSMENT — PAIN SCALES - GENERAL: PAINLEVEL: NO PAIN (0)

## 2023-09-27 NOTE — PROGRESS NOTES
"  Assessment & Plan     Discussed checking labs and EKG. ZIO ordered. She does have a history of anxiety. Could be a component, but will check for cardiac rhythm abnormality. Once ZIO result is known, I discussed the possibility of a low dose beta blocker and/or low dose anxiety medication. She isn't wanting to take a medication daily.     (R00.0) Tachycardia  (primary encounter diagnosis)  Comment: Check labs. Check EKG. ZIO monitor   Plan: EKG 12-lead complete w/read - (Clinic         Performed), Iron and iron binding capacity,         Ferritin, Magnesium, Vitamin D Deficiency, CBC         with platelets, Comprehensive metabolic panel,         Adult Leadless EKG Monitor 8 to 14 Days              BMI:   Estimated body mass index is 28.79 kg/m  as calculated from the following:    Height as of 9/29/22: 1.6 m (5' 3\").    Weight as of this encounter: 73.7 kg (162 lb 8 oz).   Weight management plan: Discussed healthy diet and exercise guidelines    See Patient Instructions    Return if symptoms worsen or fail to improve.    WILFREDO Silveira [facility]    Gaye Abdalla is a 33 year old, presenting for the following health issues:    Heart Problem      HPI     Concern - Rapid Heartbeat  Onset: ongoing off and on   Description: Faint feeling, bounding feeling in chest  Intensity: moderate  Progression of Symptoms:  intermittent  Accompanying Signs & Symptoms: bounding feeling in chest; reports slight SOB  Previous history of similar problem: has been off and on for years  Precipitating factors:        Worsened by: anxiety, panic attacks  Alleviating factors:        Improved by: n/a  Therapies tried and outcome: Taking Iron while on her period  -States that these episodes happen for a few minutes at a time; not consistently every day/week   -States that she is getting these bouts of bounding in her chest at random intervals; states that she can be relaxed when this " happens  -Reports that when she checks her pulse it is not bounding under her fingertips but feels bounding in her chest  Denies symptoms today       Review of Systems   Constitutional, HEENT, cardiovascular, pulmonary, gi and gu systems are negative, except as otherwise noted.      Objective    /85   Pulse 73   Temp 97.9  F (36.6  C) (Tympanic)   Resp 16   Wt 73.7 kg (162 lb 8 oz)   LMP 09/11/2023 (Within Days)   SpO2 98%   Breastfeeding No   BMI 28.79 kg/m    Body mass index is 28.79 kg/m .  Physical Exam   GENERAL: healthy, alert and no distress  NECK: no adenopathy, no asymmetry, masses, or scars and thyroid normal to palpation  RESP: lungs clear to auscultation - no rales, rhonchi or wheezes  CV: regular rate and rhythm, normal S1 S2, no S3 or S4, no murmur, click or rub, no peripheral edema and peripheral pulses strong  MS: no gross musculoskeletal defects noted, no edema  SKIN: no suspicious lesions or rashes  PSYCH: mentation appears normal, affect normal/bright

## 2023-09-28 LAB — VIT D+METAB SERPL-MCNC: 34 NG/ML (ref 20–50)

## 2023-09-29 LAB
ATRIAL RATE - MUSE: 66 BPM
DIASTOLIC BLOOD PRESSURE - MUSE: NORMAL MMHG
INTERPRETATION ECG - MUSE: NORMAL
P AXIS - MUSE: 21 DEGREES
PR INTERVAL - MUSE: 130 MS
QRS DURATION - MUSE: 86 MS
QT - MUSE: 384 MS
QTC - MUSE: 402 MS
R AXIS - MUSE: 72 DEGREES
SYSTOLIC BLOOD PRESSURE - MUSE: NORMAL MMHG
T AXIS - MUSE: 52 DEGREES
VENTRICULAR RATE- MUSE: 66 BPM

## 2023-10-02 ENCOUNTER — HOSPITAL ENCOUNTER (OUTPATIENT)
Dept: CARDIOLOGY | Facility: HOSPITAL | Age: 33
Discharge: HOME | End: 2023-10-02
Attending: NURSE PRACTITIONER | Admitting: INTERNAL MEDICINE
Payer: OTHER GOVERNMENT

## 2023-10-02 DIAGNOSIS — R00.0 TACHYCARDIA: ICD-10-CM

## 2023-10-02 PROCEDURE — 93248 EXT ECG>7D<15D REV&INTERPJ: CPT | Performed by: INTERNAL MEDICINE

## 2023-10-02 PROCEDURE — 93246 EXT ECG>7D<15D RECORDING: CPT

## 2023-10-02 NOTE — PATIENT INSTRUCTIONS
Zio patch placed on patient in outpatient appointment today. Patient was instructed to wear patch for 14 days. Skin was prepped and patch was placed following [employer] guidelines.     Patient was instructed to push button when symptomatic and fill out diary. Patient was instructed not to submerse in water and no swimming, saunas, or hot tubs. Showers may be taken keeping back towards the water. If the area DOES become wet pat it dry with a cloth. If skin irritation occurs patient was instructed to remove patch and contact [employer].    Patient understands we do not receive results until they mail patch back inside the box. Patient was made aware phone number is required for enrollment which automatically enrolls patient into text messaging program and they may receive automated phone calls. Patient can opt out at anytime. Staff reminded patient of outside billing notice which was explained to patient during the scheduling process of this monitor. Patient also instructed to contact [employer] with any questions [phone].    Patient had no further questions and agreed with plan. Patient was sent home with the box, information pamphlet and booklet to nia any incidents in.

## 2023-10-17 ENCOUNTER — OFFICE VISIT (OUTPATIENT)
Dept: FAMILY MEDICINE | Facility: OTHER | Age: 33
End: 2023-10-17
Payer: OTHER GOVERNMENT

## 2023-10-17 VITALS
WEIGHT: 165.3 LBS | BODY MASS INDEX: 29.29 KG/M2 | DIASTOLIC BLOOD PRESSURE: 84 MMHG | TEMPERATURE: 98 F | SYSTOLIC BLOOD PRESSURE: 128 MMHG | HEIGHT: 63 IN | OXYGEN SATURATION: 98 % | HEART RATE: 70 BPM | RESPIRATION RATE: 12 BRPM

## 2023-10-17 DIAGNOSIS — N39.0 ACUTE UTI (URINARY TRACT INFECTION): Primary | ICD-10-CM

## 2023-10-17 DIAGNOSIS — R39.89 URINARY PROBLEM: ICD-10-CM

## 2023-10-17 LAB
ALBUMIN UR-MCNC: NEGATIVE MG/DL
APPEARANCE UR: CLEAR
BACTERIA #/AREA URNS HPF: ABNORMAL /HPF
BILIRUB UR QL STRIP: NEGATIVE
COLOR UR AUTO: ABNORMAL
GLUCOSE UR STRIP-MCNC: NEGATIVE MG/DL
HGB UR QL STRIP: ABNORMAL
KETONES UR STRIP-MCNC: NEGATIVE MG/DL
LEUKOCYTE ESTERASE UR QL STRIP: ABNORMAL
NITRATE UR QL: NEGATIVE
PH UR STRIP: 6 [PH] (ref 5–9)
RBC URINE: 1 /HPF
SP GR UR STRIP: 1.01 (ref 1–1.03)
SQUAMOUS EPITHELIAL: 1 /HPF
UROBILINOGEN UR STRIP-MCNC: NORMAL MG/DL
WBC URINE: 15 /HPF

## 2023-10-17 PROCEDURE — 87186 SC STD MICRODIL/AGAR DIL: CPT | Mod: ZL

## 2023-10-17 PROCEDURE — 99214 OFFICE O/P EST MOD 30 MIN: CPT

## 2023-10-17 PROCEDURE — 81001 URINALYSIS AUTO W/SCOPE: CPT | Mod: ZL

## 2023-10-17 RX ORDER — SULFAMETHOXAZOLE/TRIMETHOPRIM 800-160 MG
1 TABLET ORAL 2 TIMES DAILY
Qty: 6 TABLET | Refills: 0 | Status: SHIPPED | OUTPATIENT
Start: 2023-10-17 | End: 2023-10-20

## 2023-10-17 ASSESSMENT — PAIN SCALES - GENERAL: PAINLEVEL: NO PAIN (0)

## 2023-10-17 NOTE — PATIENT INSTRUCTIONS
Please refer to your AVS for follow up and pain/symptoms management recommendations (I.e.: medications, helpful conservative treatment modalities, appropriate follow up if need to a specialist or family practice, etc.). Please return to urgent care if your symptoms change or worsen.     Discharge instructions:  -Follow up with persistent symptoms with primary care (or ER if severe worsening)  -If you were prescribed a medication(s), please take this as prescribed/directed  -Monitor your symptoms, if changing/worsening, return to UC/ER or PCP for follow up    Urinary Tract Infection (UTI):  -If you were prescribed an antibiotic, please take this as directed and until completion.     -If your urine was sent for a culture, please note this takes on average 1-3 days to return. Urine cultures will show us if there is/if what bacteria grows from your urine. If a change to your treatment plan (antibiotics, etc.) is needed based off your urine culture we will contact you.     -For pain control (if applicable), alternating Tylenol and Ibuprofen is recommended  -Alternate every 4 hours as needed. I.e.: Ibuprofen at 8am, Tylenol 12pm, Ibuprofen 4pm   -Daily maximum of Tylenol is 4000mg (recommend staying under 3000mg)  -Daily maximum of Ibuprofen is 3200 mg    -A warm heating pad may help as well.     -Rest/relaxation and keeping hydrated with clear liquids (ie: water or cranberry juice - do not do cranberry juice if on Coumadin/Warfarin).     -Empty your bladder when you feel the urge versus holding urine, after urinating you can bear-down to empty bladder completely, after peeing wipe front to back to avoid introducing bacteria towards vaginal area.     -AZO/Pyridium - may take up to 3 times a day, note that this may turn your urine orange    -Avoid sexual intercourse until you have completed your medication.     -Return to ER if any of the following occur: Worsening of symptoms. Fever over 101 F (38.3 C), No improvement by  the third day of treatment, Increasing back or abdominal pain, vomiting, unable to keep fluids or medicine down, weakness, dizziness, or fainting, vaginal discharge, pain, redness, or swelling of the vaginal area

## 2023-10-17 NOTE — PROGRESS NOTES
ASSESSMENT/PLAN:    I have reviewed the nursing notes.  I have reviewed the findings, diagnosis, plan and need for follow up with the patient.    1. Acute UTI (urinary tract infection)  2. Urinary problem  - UA Macroscopic with reflex to Microscopic and Culture  - Urine Culture  - sulfamethoxazole-trimethoprim (BACTRIM DS) 800-160 MG tablet; Take 1 tablet by mouth 2 times daily for 3 days  Dispense: 6 tablet; Refill: 0    Patient presents with urinary symptoms.  Patient's vitals are stable and she appears nontoxic.  Urinalysis was positive for a moderate amount of leukocyte esterase and blood.  Will treat for UTI with Bactrim.  Culture is pending and patient will be notified of the results, antibiotics will be changed if needed based off of culture and sensitivity.  Advised patient to continue to push fluids and may take Tylenol and ibuprofen or Azo as needed for urinary tract discomfort.    Discussed warning signs/symptoms indicative of need to f/u    Follow up if symptoms persist or worsen or concerns    I explained my diagnostic considerations and recommendations to the patient, who voiced understanding and agreement with the treatment plan. All questions were answered. We discussed potential side effects of any prescribed or recommended therapies, as well as expectations for response to treatments.    WILFREDO Sosa CNP  10/17/2023  10:32 AM    HPI:    Lianne Martinez is a 33 year old female  who presents to [facility] today for concerns of urinary symptoms    Patient presents with concerns of possible UTI, x 1 day    Symptoms: dysuria, urgency, frequency, and burning  Flank Pain or Back Pain: No  Blood in Urine: Yes: +  Last Urination: in clinic  Able to Completely Urinate/Void: Yes  Prior UTIs: Yes  Exposures to STIs/STDs: No  Fevers, chills, N/V/D: No  Change in Bowel Habits: No  Vaginal Symptoms or Discharge: No  Recent swimming/use of hot tubs/swimming pools/lakes: No    LMP: 10/6/23    Treatments  "tried: increased fluids    Denies CP, SOB, calf tenderness. No new medications. Denies exposure to ill or COVID positive patients.     Allergies: penicillins    PCP: Elizabeth Walker    Past Medical History:   Diagnosis Date    Cholestasis during pregnancy (H28)     Family history of genetic disease     mother  in her 30s of primary biliary cirrhosis. LFTs 18 WNL     IBS (irritable bowel syndrome)     Vulvodynia 2018     Past Surgical History:   Procedure Laterality Date    C/SECTION, LOW TRANSVERSE  10/03/2015     SECTION N/A 2021    Procedure: REPEAT  SECTION;  Surgeon: Ellie Funez MD;  Location:  OR     Social History     Tobacco Use    Smoking status: Never    Smokeless tobacco: Never   Substance Use Topics    Alcohol use: Never     Current Outpatient Medications   Medication Sig Dispense Refill    Magnesium Oxide 250 MG TABS       Multiple Vitamins-Minerals (MULTIVITAMIN ADULT PO) Take 1 tablet by mouth daily      Vitamin D3 (CHOLECALCIFEROL) 25 mcg (1000 units) tablet Take by mouth daily       Allergies   Allergen Reactions    Penicillins      Throat itching, rash     Past medical history, past surgical history, current medications and allergies reviewed and accurate to the best of my knowledge.      ROS:  Refer to HPI    /84 (BP Location: Left arm, Patient Position: Sitting, Cuff Size: Adult Regular)   Pulse 70   Temp 98  F (36.7  C) (Tympanic)   Resp 12   Ht 1.6 m (5' 3\")   Wt 75 kg (165 lb 4.8 oz)   LMP 10/05/2023 (Exact Date)   SpO2 98%   BMI 29.28 kg/m      EXAM:  General Appearance: Well appearing 33 year old female, appropriate appearance for age. No acute distress   Respiratory: normal chest wall and respirations.  Normal effort.  Clear to auscultation bilaterally, no wheezing, crackles or rhonchi.  No increased work of breathing.  No cough appreciated.  Cardiac: RRR with no murmurs  Abdomen: soft, nontender, no rigidity, no rebound tenderness " or guarding, normal bowel sounds present  :  No suprapubic tenderness to palpation.  Absent CVA tenderness to palpation.    Musculoskeletal:  Equal movement of bilateral upper extremities.  Equal movement of bilateral lower extremities.  Normal gait.    Dermatological: no rashes noted of exposed skin  Neuro: Alert and oriented to person, place, and time.    Psychological: normal affect, alert, oriented, and pleasant.     Labs:  Results for orders placed or performed in visit on 10/17/23   UA Macroscopic with reflex to Microscopic and Culture     Status: Abnormal    Specimen: Urine, Clean Catch   Result Value Ref Range    Color Urine Light Yellow Colorless, Straw, Light Yellow, Yellow    Appearance Urine Clear Clear    Glucose Urine Negative Negative mg/dL    Bilirubin Urine Negative Negative    Ketones Urine Negative Negative mg/dL    Specific Gravity Urine 1.006 1.000 - 1.030    Blood Urine Moderate (A) Negative    pH Urine 6.0 5.0 - 9.0    Protein Albumin Urine Negative Negative mg/dL    Urobilinogen Urine Normal Normal, 2.0 mg/dL    Nitrite Urine Negative Negative    Leukocyte Esterase Urine Moderate (A) Negative    Bacteria Urine Few (A) None Seen /HPF    RBC Urine 1 <=2 /HPF    WBC Urine 15 (H) <=5 /HPF    Squamous Epithelials Urine 1 <=1 /HPF    Narrative    Urine Culture ordered based on laboratory criteria

## 2023-10-17 NOTE — NURSING NOTE
"Pt presents to  for UTI symptoms since yesterday - tingling with urination, some blood with urination and frequent urination. Pt did take AZO test at home and it was positive for UTI.    Chief Complaint   Patient presents with    UTI       FOOD SECURITY SCREENING QUESTIONS  Hunger Vital Signs:  Within the past 12 months we worried whether our food would run out before we got money to buy more. Never  Within the past 12 months the food we bought just didn't last and we didn't have money to get more. Never  Olga Dearmon 10/17/2023 10:23 AM      Initial /84 (BP Location: Left arm, Patient Position: Sitting, Cuff Size: Adult Regular)   Pulse 70   Temp 98  F (36.7  C) (Tympanic)   Resp 12   Ht 1.6 m (5' 3\")   Wt 75 kg (165 lb 4.8 oz)   LMP 10/05/2023 (Exact Date)   SpO2 98%   BMI 29.28 kg/m   Estimated body mass index is 29.28 kg/m  as calculated from the following:    Height as of this encounter: 1.6 m (5' 3\").    Weight as of this encounter: 75 kg (165 lb 4.8 oz).  Medication Reconciliation: complete    Olga Dearmon  "

## 2023-10-19 LAB — BACTERIA UR CULT: ABNORMAL

## 2023-12-17 ENCOUNTER — HEALTH MAINTENANCE LETTER (OUTPATIENT)
Age: 33
End: 2023-12-17

## 2024-05-10 ENCOUNTER — ANCILLARY PROCEDURE (OUTPATIENT)
Dept: MAMMOGRAPHY | Facility: OTHER | Age: 34
End: 2024-05-10
Attending: NURSE PRACTITIONER
Payer: OTHER GOVERNMENT

## 2024-05-10 DIAGNOSIS — Z12.31 VISIT FOR SCREENING MAMMOGRAM: ICD-10-CM

## 2024-05-10 DIAGNOSIS — Z80.3 FAMILY HISTORY OF MALIGNANT NEOPLASM OF BREAST: ICD-10-CM

## 2024-05-10 PROCEDURE — 77067 SCR MAMMO BI INCL CAD: CPT | Mod: TC | Performed by: RADIOLOGY

## 2024-05-10 PROCEDURE — 77063 BREAST TOMOSYNTHESIS BI: CPT | Mod: TC | Performed by: RADIOLOGY

## 2024-05-13 ENCOUNTER — TELEPHONE (OUTPATIENT)
Dept: MAMMOGRAPHY | Facility: OTHER | Age: 34
End: 2024-05-13

## 2024-12-09 ENCOUNTER — HOSPITAL ENCOUNTER (EMERGENCY)
Facility: OTHER | Age: 34
Discharge: HOME | End: 2024-12-09
Attending: PHYSICIAN ASSISTANT
Payer: OTHER GOVERNMENT

## 2024-12-09 ENCOUNTER — APPOINTMENT (OUTPATIENT)
Dept: ULTRASOUND IMAGING | Facility: OTHER | Age: 34
End: 2024-12-09
Attending: PHYSICIAN ASSISTANT
Payer: OTHER GOVERNMENT

## 2024-12-09 VITALS
TEMPERATURE: 97.5 F | OXYGEN SATURATION: 97 % | WEIGHT: 145 LBS | BODY MASS INDEX: 25.69 KG/M2 | HEART RATE: 84 BPM | SYSTOLIC BLOOD PRESSURE: 157 MMHG | RESPIRATION RATE: 16 BRPM | HEIGHT: 63 IN | DIASTOLIC BLOOD PRESSURE: 105 MMHG

## 2024-12-09 DIAGNOSIS — M79.662 PAIN OF LEFT CALF: ICD-10-CM

## 2024-12-09 PROCEDURE — 93971 EXTREMITY STUDY: CPT | Mod: LT

## 2024-12-09 PROCEDURE — 99283 EMERGENCY DEPT VISIT LOW MDM: CPT | Performed by: PHYSICIAN ASSISTANT

## 2024-12-09 PROCEDURE — 99284 EMERGENCY DEPT VISIT MOD MDM: CPT | Mod: 25 | Performed by: PHYSICIAN ASSISTANT

## 2024-12-09 ASSESSMENT — ACTIVITIES OF DAILY LIVING (ADL)
ADLS_ACUITY_SCORE: 46
ADLS_ACUITY_SCORE: 46

## 2024-12-09 ASSESSMENT — COLUMBIA-SUICIDE SEVERITY RATING SCALE - C-SSRS
6. HAVE YOU EVER DONE ANYTHING, STARTED TO DO ANYTHING, OR PREPARED TO DO ANYTHING TO END YOUR LIFE?: NO
2. HAVE YOU ACTUALLY HAD ANY THOUGHTS OF KILLING YOURSELF IN THE PAST MONTH?: NO
1. IN THE PAST MONTH, HAVE YOU WISHED YOU WERE DEAD OR WISHED YOU COULD GO TO SLEEP AND NOT WAKE UP?: NO

## 2024-12-09 NOTE — ED TRIAGE NOTES
"ED Nursing Triage Note (General)   ________________________________    Lianne Martinez is a 34 year old Female that presents to triage private car  with history of  calf pain reported by patient . Symptoms started last night. Interventions prior to arrival include 324mg aPSIRIN.  BP (!) 157/105   Pulse 84   Temp 97.5  F (36.4  C)   Resp 16   Ht 1.6 m (5' 3\")   Wt 65.8 kg (145 lb)   SpO2 97%   BMI 25.69 kg/m  t  Patient appears alert  and oriented    GCS Total = 15    Action taken:  WAITING ROOM      PRE HOSPITAL PRIOR LIVING SITUATION Spouse and Children       Triage Assessment (Adult)       Row Name 12/09/24 0930          Triage Assessment    Airway WDL WDL        Respiratory WDL    Respiratory WDL WDL        Skin Circulation/Temperature WDL    Skin Circulation/Temperature WDL WDL        Cardiac WDL    Cardiac WDL WDL        Peripheral/Neurovascular WDL    Peripheral Neurovascular WDL WDL        Cognitive/Neuro/Behavioral WDL    Cognitive/Neuro/Behavioral WDL WDL                     "

## 2024-12-09 NOTE — ED NOTES
Patient reports pain to the back of her left calf.  Swelling noted.  Pedal pulses to the right foot +2, +1 to left.  Rates pain 3/10.  No recent tylenol or ibuprofen.  US in room.

## 2024-12-09 NOTE — DISCHARGE INSTRUCTIONS
-Unknown to us what is causing her symptoms.  No signs of acute DVT on ultrasound.  No signs of infection.  No signs of ischemia.  -Recommend close follow-up with primary care.  Use ibuprofen and Tylenol as needed for pain.  Elevate and use Ace wrap.  Ace wrap was applied here in the ER.  -Return to the ER for any new or worsening of symptoms.

## 2024-12-09 NOTE — ED NOTES
Patient changing into a gown.  In room with  at bedside.  Reports being positive for COVID. Reports pain and swelling to left calf.

## 2024-12-11 NOTE — ED PROVIDER NOTES
EMERGENCY DEPARTMENT ENCOUNTER      NAME: Lianne Martinez  AGE: 34 year old female  YOB: 1990  MRN: [id number]  EVALUATION DATE & TIME: 2024  9:41 AM    PCP: Elizabeth Walker    ED PROVIDER: Carrington Melgar PA-C       CHIEF COMPLAINT:  Chief Complaint   Patient presents with    Leg Pain         HPI  Lianne Martinez is a pleasant 34 year old female presents to the ER via private vehicle with her  for evaluation of atraumatic left calf pain.  Pain last night.  Denies any injury or trauma.  Denies any prior history of DVT.  Pain localized to the calf without radiation.  Worse with palpation or movement.  No numbness or paresthesias.  No lower extremity pallor.  Slightly swollen compared to the right.  No chest pain or shortness of breath.  No fevers or chills.  No redness, erythema, or warmth.      REVIEW OF SYSTEMS   Review of Systems  As above, otherwise ROS is unremarkable.      PAST MEDICAL HISTORY:  Past Medical History:   Diagnosis Date    Cholestasis during pregnancy     Family history of genetic disease     mother  in her 30s of primary biliary cirrhosis. LFTs 18 WNL     IBS (irritable bowel syndrome)     Vulvodynia 2018         PAST SURGICAL HISTORY:  Past Surgical History:   Procedure Laterality Date    C/SECTION, LOW TRANSVERSE  10/03/2015     SECTION N/A 2021    Procedure: REPEAT  SECTION;  Surgeon: Ellie Funez MD;  Location:  OR         CURRENT MEDICATIONS:    Current Outpatient Medications   Medication Instructions    Magnesium Oxide 250 MG TABS Oral    Multiple Vitamins-Minerals (MULTIVITAMIN ADULT PO) 1 tablet, Oral, DAILY    Vitamin D3 (CHOLECALCIFEROL) 25 mcg (1000 units) tablet Oral, DAILY         ALLERGIES:  Allergies   Allergen Reactions    Penicillins      Throat itching, rash         FAMILY HISTORY:  Family History   Problem Relation Age of Onset    Cirrhosis Mother     Liver Disease Mother     Liver Disease Maternal  Aunt     Cirrhosis Maternal Aunt     Breast Cancer Paternal Aunt 32        unknown if 1 or both breasts         SOCIAL HISTORY:   Social History     Socioeconomic History    Marital status:      Spouse name: heena arteaga     Number of children: 1    Years of education: None    Highest education level: None   Occupational History    Occupation: substitude educator      Comment: [employer] [phone]   Tobacco Use    Smoking status: Never    Smokeless tobacco: Never   Vaping Use    Vaping status: Never Used   Substance and Sexual Activity    Alcohol use: Never    Drug use: Never    Sexual activity: Yes     Partners: Male     Birth control/protection: None     Social Drivers of Health     Financial Resource Strain: Low Risk  (9/27/2023)    Financial Resource Strain     Within the past 12 months, have you or your family members you live with been unable to get utilities (heat, electricity) when it was really needed?: No   Food Insecurity: Low Risk  (9/27/2023)    Food Insecurity     Within the past 12 months, did you worry that your food would run out before you got money to buy more?: No     Within the past 12 months, did the food you bought just not last and you didn t have money to get more?: No   Transportation Needs: Low Risk  (9/27/2023)    Transportation Needs     Within the past 12 months, has lack of transportation kept you from medical appointments, getting your medicines, non-medical meetings or appointments, work, or from getting things that you need?: No   Interpersonal Safety: Low Risk  (10/17/2023)    Interpersonal Safety     Do you feel physically and emotionally safe where you currently live?: Yes     Within the past 12 months, have you been hit, slapped, kicked or otherwise physically hurt by someone?: No     Within the past 12 months, have you been humiliated or emotionally abused in other ways by your partner or ex-partner?: No   Housing Stability: Low Risk  (9/27/2023)    Housing Stability     Do  "you have housing? : Yes     Are you worried about losing your housing?: No       ==================================================================================================================================    PHYSICAL EXAM    VITAL SIGNS: BP (!) 157/105   Pulse 84   Temp 97.5  F (36.4  C)   Resp 16   Ht 1.6 m (5' 3\")   Wt 65.8 kg (145 lb)   SpO2 97%   BMI 25.69 kg/m      No data found.    Physical Exam  Vitals and nursing note reviewed.   Constitutional:       Appearance: Normal appearance.   HENT:      Nose: Nose normal.      Mouth/Throat:      Mouth: Mucous membranes are moist.   Eyes:      Conjunctiva/sclera: Conjunctivae normal.   Cardiovascular:      Rate and Rhythm: Normal rate.      Pulses: Normal pulses.      Heart sounds: Normal heart sounds.   Pulmonary:      Effort: Pulmonary effort is normal.      Breath sounds: Normal breath sounds.   Musculoskeletal:         General: Normal range of motion.      Cervical back: Normal range of motion.      Right lower leg: No edema.      Left lower leg: Edema present.      Comments: Very slight swelling to the left calf region.  Positive tenderness with her calf.  Negative Homans.  Palpable DP and PT pulse.  Sensation to light touch intact.  Left Achilles tendon tenderness.  No left knee tenderness.   Skin:     General: Skin is warm and dry.      Findings: No erythema or rash.   Neurological:      General: No focal deficit present.      Mental Status: She is alert.   Psychiatric:         Mood and Affect: Mood normal.            ==================================================================================================================================    LABS & RADIOLOGY:  All pertinent labs reviewed and interpreted. Reviewed all pertinent imaging. Please see official radiology report.  Results for orders placed or performed during the hospital encounter of 12/09/24   US Lower Extremity Venous Duplex Left    Impression    IMPRESSION:    No acute deep " "venous thrombosis.           DWAYNE NORTON MD         SYSTEM ID:  [id number]     US Lower Extremity Venous Duplex Left   Final Result   IMPRESSION:      No acute deep venous thrombosis.                DWAYNE NORTON MD            SYSTEM ID:  [id number]            ==================================================================================================================================    ED COURSE, MEDICAL DECISION MAKING, FINAL IMPRESSION AND PLAN:     Assessment / Plan:  1. Pain of left calf        The patient was interviewed and examined.  HPI and physical exam as below.  Differential diagnosis and MDM Key Documentation Elements as below.  Vitals, triage note, and nursing notes were reviewed.  BP (!) 157/105   Pulse 84   Temp 97.5  F (36.4  C)   Resp 16   Ht 1.6 m (5' 3\")   Wt 65.8 kg (145 lb)   SpO2 97%   BMI 25.69 kg/m      Differential includes but is not limited to DVT, compartment syndrome, calf strain after abnormality    Patient is afebrile with a low blood pressure.  Patient was in no acute distress.  Tenderness and slight swelling to left calf.  Ultrasound was negative for acute DVT.  No signs of infection.  No signs of compartment syndrome or ischemia.  No bony tenderness, x-rays were considered but deferred.  Unknown to this was causing pain in the left calf.  Ace wrap was applied here in the ER.  Ibuprofen and Tylenol for home.  Stretch and elevate as well as use ice for additional pain relief.  Recommend close follow-up with primary care.  Return to the ER for any new or worsening symptoms.    Pertinent Labs & Imaging studies reviewed. (See chart for details)  Results for orders placed or performed during the hospital encounter of 12/09/24   US Lower Extremity Venous Duplex Left    Impression    IMPRESSION:    No acute deep venous thrombosis.           DWAYNE NORTON MD         SYSTEM ID:  [id number]     Lab Results   Component Value Date    ABORH B NEG 11/12/2021         Reassessments, " Medications, Interventions, & Response to Treatments:  -as above    Medications given during today's ER visit:  Medications - No data to display    Consultations:  None    Decision Rules, Medical Calculators, and Risk Stratification Tools:  None    MDM Key Documentation Elements for Patient's Evaluation:  Differential diagnosis to include high risk considerations: As above  Escalation to admission/observation considered: Admission/observation considered, but patient does not meet admission criteria  Discussions and management with other clinicians:    3a. Independent interpretation of testing performed by another health professional:  -No  3b. Discussion of management or test interpretation with another health professional: -No  Independent interpretation of tests:  Ordering and/or review of 1 test(s)  Discussion of test interpretations with radiology:  No  History obtained from source other than patient or assessment requiring an independent historian:  No  Review of non-ED/external records:  review of 1 records  Diagnostic tests considered but not ultimately performed/deferred:  -X-ray left tib-fib  Prescription medications considered but not prescribed:  -Opiates  Chronic conditions affecting care:  -None  Care affected by social determinants of health:  -None    The patient's management involved:   - Imaging studies      A shared decision making model was used. Time was taken to answer all questions.  Patient and/or associated parties understood and were agreeable to treatment plan.  Plan and all results were discussed. Warning signs and close return precautions to return to the ED given. Copy of results given. Discharged in stable condition. Discharged with discharge instructions outlining plan for further care and follow up.      New prescriptions started at today's ER visit  Discharge Medication List as of 12/9/2024 11:25 AM           ==================================================================================================================================      Prasanna ALMEIDA PA-C, personally performed the services described in this documentation, and it is both accurate and complete.       Carrington Melgar PA-C  12/10/24 2011

## 2025-01-12 ENCOUNTER — HEALTH MAINTENANCE LETTER (OUTPATIENT)
Age: 35
End: 2025-01-12

## 2025-04-02 NOTE — PROGRESS NOTES
"  Assessment & Plan     (J34.89) Sore in nose  (primary encounter diagnosis)  Comment: Treat with Bactroban ointment. Appears to be a form of a staph infection. Supportive care discussed    Plan: mupirocin (BACTROBAN) 2 % external ointment              BMI  Estimated body mass index is 27.33 kg/m  as calculated from the following:    Height as of this encounter: 1.6 m (5' 3\").    Weight as of this encounter: 70 kg (154 lb 4.8 oz).   Weight management plan: Discussed healthy diet and exercise guidelines      See Patient Instructions    Return if symptoms worsen or fail to improve.    Gaye Abdalla is a 34 year old, presenting for the following health issues:  Sore        4/3/2025     1:01 PM   Additional Questions   Roomed by Jessica BEARD     History of Present Illness       Reason for visit:  Sore in nose  Symptom onset:  3-7 days ago  Symptoms include:  Sore nose  Symptom intensity:  Mild  Symptom progression:  Worsening  Had these symptoms before:  Yes  Has tried/received treatment for these symptoms:  No  What makes it worse:  No  What makes it better:  No   She is taking medications regularly.        Sore   Onset: x 1 week  Description: sore in nose, left nostril-   Progression of Symptoms:  same  Accompanying Signs & Symptoms: painful  Previous history of similar problem: 7-8 years ago this happened and she dealt with it for a year her physician back then though it could have been MRSA that healed itself.   Therapies tried and outcome: Vaseline to keep it moisturized          Review of Systems  Constitutional, neuro, ENT, endocrine, pulmonary, cardiac, gastrointestinal, genitourinary, musculoskeletal, integument and psychiatric systems are negative, except as otherwise noted.      Objective    /74 (BP Location: Right arm, Patient Position: Sitting, Cuff Size: Adult Regular)   Pulse 71   Temp 97.8  F (36.6  C) (Tympanic)   Ht 1.6 m (5' 3\")   Wt 70 kg (154 lb 4.8 oz)   LMP 03/29/2025 (Within Days)  "  SpO2 98%   BMI 27.33 kg/m    Body mass index is 27.33 kg/m .  Physical Exam   GENERAL: alert and no distress  HENT: ear canals and TM's normal, mouth without ulcers or lesions. +left anterior nare with erythema with white discharge with partial scab formation. Right nare is clear   NECK: no adenopathy, no asymmetry, masses, or scars  RESP: lungs clear to auscultation - no rales, rhonchi or wheezes  CV: regular rate and rhythm, normal S1 S2, no S3 or S4, no murmur, click or rub, no peripheral edema  SKIN: no suspicious lesions or rashes  PSYCH: mentation appears normal, affect normal/bright        Signed Electronically by: WILFREDO Silveira CNP

## 2025-04-03 ENCOUNTER — OFFICE VISIT (OUTPATIENT)
Dept: FAMILY MEDICINE | Facility: OTHER | Age: 35
End: 2025-04-03
Attending: NURSE PRACTITIONER
Payer: COMMERCIAL

## 2025-04-03 VITALS
HEART RATE: 71 BPM | OXYGEN SATURATION: 98 % | WEIGHT: 154.3 LBS | DIASTOLIC BLOOD PRESSURE: 74 MMHG | TEMPERATURE: 97.8 F | HEIGHT: 63 IN | BODY MASS INDEX: 27.34 KG/M2 | SYSTOLIC BLOOD PRESSURE: 114 MMHG

## 2025-04-03 DIAGNOSIS — J34.89 SORE IN NOSE: Primary | ICD-10-CM

## 2025-04-03 RX ORDER — IVERMECTIN 10 MG/G
CREAM TOPICAL DAILY
COMMUNITY
Start: 2025-03-04

## 2025-04-03 RX ORDER — MUPIROCIN 20 MG/G
OINTMENT TOPICAL 2 TIMES DAILY
Qty: 30 G | Refills: 0 | Status: SHIPPED | OUTPATIENT
Start: 2025-04-03 | End: 2025-04-13

## 2025-04-03 RX ORDER — DOXYCYCLINE 50 MG/1
50 CAPSULE ORAL 2 TIMES DAILY WITH MEALS
COMMUNITY
Start: 2025-03-04

## 2025-04-03 ASSESSMENT — PAIN SCALES - GENERAL: PAINLEVEL_OUTOF10: MILD PAIN (2)

## (undated) DEVICE — ESU PENCIL SMOKE EVAC W/ROCKER SWITCH 0703-047-000

## (undated) DEVICE — PACK C SECTION SMA15CSFCA

## (undated) DEVICE — SU PLAIN 3-0 SH 27" G322H

## (undated) DEVICE — DRSG MEDIPORE 3 1/2X8" 3570

## (undated) DEVICE — SU VICRYL 0 CTX 36" UND J978H

## (undated) DEVICE — CATH TRAY FOLEY SURESTEP 16FR W/URINE MTR STATLK LF A303416A

## (undated) DEVICE — SU VICRYL 0 CT-1 36" J346H

## (undated) DEVICE — SOL WATER 1500ML

## (undated) DEVICE — PAD CHUX UNDERPAD 30X36" P3036C

## (undated) DEVICE — SU MONOCRYL 0 CT-1 36" UND Y946H

## (undated) DEVICE — PAD ABD 5X9" STERILE 9190A

## (undated) DEVICE — SU MONOCRYL 3-0 PS-2 27" Y427H

## (undated) DEVICE — COVER LIGHT HANDLE LT-F02

## (undated) DEVICE — GLOVE PROTEXIS POWDER FREE SMT 6.5  2D72PT65X

## (undated) DEVICE — DRSG STERI STRIP 1/2X4" R1547

## (undated) DEVICE — SLEEVE COMPRESSION SCD KNEE MED 74022

## (undated) DEVICE — SU VICRYL 4-0 KS 27" UND J662H

## (undated) DEVICE — ESU GROUND PAD ADULT W/CORD E7507

## (undated) DEVICE — PREP CHLORAPREP 26ML TINTED ORANGE  260815

## (undated) DEVICE — Device

## (undated) DEVICE — GLOVE BIOGEL 7 LATEX

## (undated) RX ORDER — OXYTOCIN/0.9 % SODIUM CHLORIDE 30/500 ML
PLASTIC BAG, INJECTION (ML) INTRAVENOUS
Status: DISPENSED
Start: 2021-11-11

## (undated) RX ORDER — KETOROLAC TROMETHAMINE 30 MG/ML
INJECTION, SOLUTION INTRAMUSCULAR; INTRAVENOUS
Status: DISPENSED
Start: 2021-11-11

## (undated) RX ORDER — DEXAMETHASONE SODIUM PHOSPHATE 10 MG/ML
INJECTION, SOLUTION INTRAMUSCULAR; INTRAVENOUS
Status: DISPENSED
Start: 2021-11-11

## (undated) RX ORDER — DEXAMETHASONE SODIUM PHOSPHATE 4 MG/ML
INJECTION, SOLUTION INTRA-ARTICULAR; INTRALESIONAL; INTRAMUSCULAR; INTRAVENOUS; SOFT TISSUE
Status: DISPENSED
Start: 2021-11-11

## (undated) RX ORDER — DEXMEDETOMIDINE HYDROCHLORIDE 100 UG/ML
INJECTION, SOLUTION INTRAVENOUS
Status: DISPENSED
Start: 2021-11-11

## (undated) RX ORDER — SODIUM CHLORIDE 9 MG/ML
INJECTION, SOLUTION INTRAVENOUS
Status: DISPENSED
Start: 2021-11-11

## (undated) RX ORDER — BUPIVACAINE HYDROCHLORIDE 2.5 MG/ML
INJECTION, SOLUTION INFILTRATION; PERINEURAL
Status: DISPENSED
Start: 2021-11-11

## (undated) RX ORDER — PHENYLEPHRINE HYDROCHLORIDE 10 MG/ML
INJECTION INTRAVENOUS
Status: DISPENSED
Start: 2021-11-11

## (undated) RX ORDER — ONDANSETRON 2 MG/ML
INJECTION INTRAMUSCULAR; INTRAVENOUS
Status: DISPENSED
Start: 2021-11-11